# Patient Record
Sex: MALE | Race: WHITE | NOT HISPANIC OR LATINO | Employment: OTHER | URBAN - METROPOLITAN AREA
[De-identification: names, ages, dates, MRNs, and addresses within clinical notes are randomized per-mention and may not be internally consistent; named-entity substitution may affect disease eponyms.]

---

## 2017-06-20 ENCOUNTER — HOSPITAL ENCOUNTER (OUTPATIENT)
Facility: CLINIC | Age: 67
End: 2017-06-20
Attending: COLON & RECTAL SURGERY | Admitting: COLON & RECTAL SURGERY
Payer: MEDICARE

## 2021-08-23 DIAGNOSIS — Z11.59 ENCOUNTER FOR SCREENING FOR OTHER VIRAL DISEASES: ICD-10-CM

## 2021-08-29 ENCOUNTER — LAB (OUTPATIENT)
Dept: URGENT CARE | Facility: URGENT CARE | Age: 71
End: 2021-08-29
Payer: COMMERCIAL

## 2021-08-29 DIAGNOSIS — Z11.59 ENCOUNTER FOR SCREENING FOR OTHER VIRAL DISEASES: ICD-10-CM

## 2021-08-29 PROCEDURE — U0003 INFECTIOUS AGENT DETECTION BY NUCLEIC ACID (DNA OR RNA); SEVERE ACUTE RESPIRATORY SYNDROME CORONAVIRUS 2 (SARS-COV-2) (CORONAVIRUS DISEASE [COVID-19]), AMPLIFIED PROBE TECHNIQUE, MAKING USE OF HIGH THROUGHPUT TECHNOLOGIES AS DESCRIBED BY CMS-2020-01-R: HCPCS

## 2021-08-29 PROCEDURE — U0005 INFEC AGEN DETEC AMPLI PROBE: HCPCS

## 2021-08-30 LAB — SARS-COV-2 RNA RESP QL NAA+PROBE: NEGATIVE

## 2021-09-01 ENCOUNTER — HOSPITAL ENCOUNTER (OUTPATIENT)
Facility: CLINIC | Age: 71
Discharge: HOME | End: 2021-09-01
Attending: COLON & RECTAL SURGERY | Admitting: COLON & RECTAL SURGERY
Payer: COMMERCIAL

## 2021-09-01 VITALS
HEART RATE: 60 BPM | TEMPERATURE: 97.4 F | DIASTOLIC BLOOD PRESSURE: 68 MMHG | HEIGHT: 72 IN | BODY MASS INDEX: 29.8 KG/M2 | SYSTOLIC BLOOD PRESSURE: 108 MMHG | WEIGHT: 220 LBS | OXYGEN SATURATION: 98 % | RESPIRATION RATE: 14 BRPM

## 2021-09-01 PROBLEM — Z78.9 NO BLOOD PRODUCTS: Status: ACTIVE | Noted: 2020-12-07

## 2021-09-01 LAB — COLONOSCOPY: NORMAL

## 2021-09-01 PROCEDURE — 250N000011 HC RX IP 250 OP 636: Performed by: COLON & RECTAL SURGERY

## 2021-09-01 PROCEDURE — 88305 TISSUE EXAM BY PATHOLOGIST: CPT | Mod: TC | Performed by: COLON & RECTAL SURGERY

## 2021-09-01 PROCEDURE — 45380 COLONOSCOPY AND BIOPSY: CPT | Performed by: COLON & RECTAL SURGERY

## 2021-09-01 PROCEDURE — 99153 MOD SED SAME PHYS/QHP EA: CPT | Performed by: COLON & RECTAL SURGERY

## 2021-09-01 PROCEDURE — G0500 MOD SEDAT ENDO SERVICE >5YRS: HCPCS | Performed by: COLON & RECTAL SURGERY

## 2021-09-01 RX ORDER — NALOXONE HYDROCHLORIDE 0.4 MG/ML
0.4 INJECTION, SOLUTION INTRAMUSCULAR; INTRAVENOUS; SUBCUTANEOUS
Status: CANCELLED | OUTPATIENT
Start: 2021-09-01

## 2021-09-01 RX ORDER — FENTANYL CITRATE 50 UG/ML
INJECTION, SOLUTION INTRAMUSCULAR; INTRAVENOUS PRN
Status: COMPLETED | OUTPATIENT
Start: 2021-09-01 | End: 2021-09-01

## 2021-09-01 RX ORDER — PROCHLORPERAZINE MALEATE 5 MG
5 TABLET ORAL EVERY 6 HOURS PRN
Status: CANCELLED | OUTPATIENT
Start: 2021-09-01

## 2021-09-01 RX ORDER — NALOXONE HYDROCHLORIDE 0.4 MG/ML
0.2 INJECTION, SOLUTION INTRAMUSCULAR; INTRAVENOUS; SUBCUTANEOUS
Status: CANCELLED | OUTPATIENT
Start: 2021-09-01

## 2021-09-01 RX ORDER — ONDANSETRON 4 MG/1
4 TABLET, ORALLY DISINTEGRATING ORAL EVERY 6 HOURS PRN
Status: CANCELLED | OUTPATIENT
Start: 2021-09-01

## 2021-09-01 RX ORDER — FLUMAZENIL 0.1 MG/ML
0.2 INJECTION, SOLUTION INTRAVENOUS
Status: CANCELLED | OUTPATIENT
Start: 2021-09-01 | End: 2021-09-02

## 2021-09-01 RX ORDER — ONDANSETRON 2 MG/ML
4 INJECTION INTRAMUSCULAR; INTRAVENOUS EVERY 6 HOURS PRN
Status: CANCELLED | OUTPATIENT
Start: 2021-09-01

## 2021-09-01 RX ORDER — POLYETHYLENE GLYCOL 3350 17 G/17G
17 POWDER, FOR SOLUTION ORAL
COMMUNITY
Start: 2021-07-14

## 2021-09-01 RX ADMIN — MIDAZOLAM 2 MG: 1 INJECTION INTRAMUSCULAR; INTRAVENOUS at 11:43

## 2021-09-01 RX ADMIN — FENTANYL CITRATE 100 MCG: 50 INJECTION, SOLUTION INTRAMUSCULAR; INTRAVENOUS at 11:42

## 2021-09-01 ASSESSMENT — MIFFLIN-ST. JEOR: SCORE: 1780.91

## 2021-09-01 NOTE — BRIEF OP NOTE
[facility]    Brief Operative Note    Pre-operative diagnosis: Personal history of colonic polyps [Z86.010]  Post-operative diagnosis colon polyp, diverticulosis    Procedure: Procedure(s):  COLONOSCOPY, WITH POLYPECTOMY AND BIOPSY  Surgeon: Surgeon(s) and Role:     * Evangelina Aldana MD - Primary  Anesthesia: Conscious Sedation   Estimated blood loss: None  Drains: None  Specimens:   ID Type Source Tests Collected by Time Destination   1 :  Polyp Large Intestine, Colon, Transverse SURGICAL PATHOLOGY EXAM Evangelina Aldana MD 9/1/2021 12:07 PM      Findings:   See Provation procedure note in Epic    Complications: None.  Implants: * No implants in log *

## 2021-09-01 NOTE — H&P
Pre-Endoscopy History and Physical     Kvng Jones MRN# [id number]   YOB: 1950 Age: 71 year old     Date of Procedure: 9/1/2021  [facility]  Type of Endoscopy: colonoscopy  Reason for Procedure: screening, history of polyps  Type of Anesthesia Anticipated: moderate sedation    HPI:    Kvng is a 71 year old male who will be undergoing the above procedure.  Patient reports that he had a colonoscopy in 2017 during which polyps were removed. He has noted trouble with constipation; a trial of Miralax was helpful. He denies bleeding. A few weeks ago he developed right sided abdominal pain. He was seen in urgent care. He reports that a CT scan revealed only cysts in his liver and kidneys.         He denies a personal or family history of anesthesia complications or bleeding disorders.   Prior to Admission medications    Medication Sig Start Date End Date Taking? Authorizing Provider   esomeprazole (NEXIUM) 20 MG DR capsule Take 20 mg by mouth   Yes Reported, Patient   melatonin 5 MG CAPS Take 1 tablet by mouth   Yes Reported, Patient   polyethylene glycol (MIRALAX) 17 GM/Dose powder Take 17 g by mouth 7/14/21  Yes Reported, Patient       No Known Allergies     No current facility-administered medications for this encounter.       Patient Active Problem List   Diagnosis     Erectile dysfunction of nonorganic origin     No blood products     Psoriasis        History reviewed. No pertinent past medical history.     Past Surgical History:   Procedure Laterality Date     ABDOMEN SURGERY      rectal fistula     APPENDECTOMY       COLONOSCOPY         Social History     Tobacco Use     Smoking status: Never Smoker     Smokeless tobacco: Never Used   Substance Use Topics     Alcohol use: Yes     Comment: occasional       History reviewed. No pertinent family history.    REVIEW OF SYSTEMS:     5 point ROS negative except as noted above in HPI, including  Gen., Resp., CV, GI &  system review. Abdominal pain has resolved.       PHYSICAL EXAM:   BP (!) 154/82   Pulse 68   Temp 97.4  F (36.3  C) (Skin)   Resp 14   Ht 1.829 m (6')   Wt 99.8 kg (220 lb)   SpO2 99%   BMI 29.84 kg/m   Estimated body mass index is 29.84 kg/m  as calculated from the following:    Height as of this encounter: 1.829 m (6').    Weight as of this encounter: 99.8 kg (220 lb).   GENERAL APPEARANCE: healthy  MENTAL STATUS: alert  AIRWAY EXAM: Mallampatti Class II (visualization of the soft palate, fauces, and uvula)  RESP: lungs clear to auscultation - no rales, rhonchi or wheezes  CV: regular rates and rhythm      DIAGNOSTICS:    Not indicated      IMPRESSION   ASA Class 2 - Mild systemic disease        PLAN:       A history and physical has been performed. The patient's medications and allergies have been reviewed. The risks and benefits of the procedure and the sedation options and risks were discussed with the patient.  All questions were answered and informed consent was obtained.      The above has been forwarded to the consulting provider.      Signed Electronically by: Evangelina Aldana MD  September 1, 2021

## 2021-09-02 LAB
PATH REPORT.COMMENTS IMP SPEC: NORMAL
PATH REPORT.COMMENTS IMP SPEC: NORMAL
PATH REPORT.FINAL DX SPEC: NORMAL
PATH REPORT.GROSS SPEC: NORMAL
PATH REPORT.MICROSCOPIC SPEC OTHER STN: NORMAL
PATH REPORT.RELEVANT HX SPEC: NORMAL
PHOTO IMAGE: NORMAL

## 2021-09-02 PROCEDURE — 88305 TISSUE EXAM BY PATHOLOGIST: CPT | Mod: 26 | Performed by: PATHOLOGY

## 2021-09-04 ENCOUNTER — HEALTH MAINTENANCE LETTER (OUTPATIENT)
Age: 71
End: 2021-09-04

## 2022-06-07 ENCOUNTER — HOSPITAL ENCOUNTER (OUTPATIENT)
Facility: AMBULATORY SURGERY CENTER | Age: 72
End: 2022-06-07
Attending: SURGERY | Admitting: SURGERY
Payer: COMMERCIAL

## 2022-06-07 ENCOUNTER — TELEPHONE (OUTPATIENT)
Dept: SURGERY | Facility: CLINIC | Age: 72
End: 2022-06-07

## 2022-06-07 ENCOUNTER — OFFICE VISIT (OUTPATIENT)
Dept: SURGERY | Facility: CLINIC | Age: 72
End: 2022-06-07
Payer: COMMERCIAL

## 2022-06-07 VITALS
DIASTOLIC BLOOD PRESSURE: 56 MMHG | HEIGHT: 72 IN | BODY MASS INDEX: 31.02 KG/M2 | SYSTOLIC BLOOD PRESSURE: 112 MMHG | HEART RATE: 103 BPM | WEIGHT: 229 LBS

## 2022-06-07 DIAGNOSIS — K40.91 RECURRENT RIGHT INGUINAL HERNIA: ICD-10-CM

## 2022-06-07 DIAGNOSIS — K40.91 RECURRENT RIGHT INGUINAL HERNIA: Primary | ICD-10-CM

## 2022-06-07 PROCEDURE — 99203 OFFICE O/P NEW LOW 30 MIN: CPT | Performed by: SURGERY

## 2022-06-07 RX ORDER — ROSUVASTATIN CALCIUM 10 MG/1
10 TABLET, COATED ORAL DAILY
COMMUNITY

## 2022-06-07 RX ORDER — ASPIRIN 81 MG/1
81 TABLET, CHEWABLE ORAL DAILY
COMMUNITY
End: 2022-07-13

## 2022-06-07 NOTE — PROGRESS NOTES
Dear Dr. Weems, [facility]  I was asked to see this patient by Faustina [facility] Leah for please see below.  I have seen Kvng Jones and as you know his chief complaint is right inguinal hernia that was repaired 25 years ago.  Patient is not sure if used mesh.    Feels a lump.  This comes and goes. Can be a dull ache for a few days and then no pain.   HPI:  Patient is a 72 year old male  with complaints see above  The patient noticed the symptoms about 2 plus years ago.    Patient has family history of hernia problems  Daughter at 16  Laying down makes the episode better.      Review Of Systems  Respiratory: No shortness of breath, dyspnea on exertion, cough, or hemoptysis  Cardiovascular: negative  Gastrointestinal:  Constipation doing better with toño lax  Endocrine: negative  :  Hesitancy no straining.     10 Point review of systems all others are negative.   There were no vitals taken for this visit.    No past medical history on file.    Past Surgical History:   Procedure Laterality Date     ABDOMEN SURGERY      rectal fistula     APPENDECTOMY       COLONOSCOPY       COLONOSCOPY N/A 9/1/2021    Procedure: COLONOSCOPY, WITH POLYPECTOMY AND BIOPSY;  Surgeon: Evangelina Aldana MD;  Location:  GI       Social History     Socioeconomic History     Marital status:      Spouse name: Not on file     Number of children: Not on file     Years of education: Not on file     Highest education level: Not on file   Occupational History     Not on file   Tobacco Use     Smoking status: Never Smoker     Smokeless tobacco: Never Used   Vaping Use     Vaping Use: Never used   Substance and Sexual Activity     Alcohol use: Yes     Comment: occasional     Drug use: Not on file     Sexual activity: Not on file   Other Topics Concern     Parent/sibling w/ CABG, MI or angioplasty before 65F 55M? Not Asked   Social History Narrative     Not on file     Social  Determinants of Health     Financial Resource Strain: Not on file   Food Insecurity: Not on file   Transportation Needs: Not on file   Physical Activity: Not on file   Stress: Not on file   Social Connections: Not on file   Intimate Partner Violence: Not on file   Housing Stability: Not on file       Current Outpatient Medications   Medication Sig Dispense Refill     esomeprazole (NEXIUM) 20 MG DR capsule Take 20 mg by mouth       melatonin 5 MG CAPS Take 1 tablet by mouth       polyethylene glycol (MIRALAX) 17 GM/Dose powder Take 17 g by mouth         Above was reviewed  Physical exam: There were no vitals taken for this visit.   Patient able to get up on table without difficulty.   Patient is alert and orientated.   Head eyes, nose and mouth within normal limits.  No supraclavicular or cervical adenopathy palpated.  Thyroid within normal limits.  No carotid bruits auscultated.  Lungs are clear to auscultation  Heart is regular rate and rhythm with no murmur or thrills noted.  No costal vertebral angle tenderness noted.  Abdomen is abdomen is soft without significant tenderness, masses, organomegaly or guarding  bowel sounds are positive and no caput medusa noted.  Has a tender lump a the right groin that feels like fat fairly lateral on the hernia site.  If he has had an open with  mesh or this is just a recurrence at the lateral of the closure for a bassini repair.    Testicles are normal.    Skin was warm and pink  Normal Affect      Lower extremity edema is not present.    I did look at the MRI form [facility] and it does not cover the area of the groin.      Assessment: patient had right inguinal hernia repair about 25 years ago.  He has had discomfort in this area off and on.  Worse with lifting.  It used to be that he would walk or lay down and it would feel better but now walking makes it worse when it is bothering him.     25 years ago probably did not use mesh.  This small tender lump that feels may be  lateral to the spermatic cord could be a recurrence.  It seems to be more prominent when the patient is having more discomfort and feels slightly larger.   Plan to do we discussed robotic but not sure I would see this from the inside.    So will do open and repair it and cover with mesh .  Possible mesh just deep to the defect.    There is an increase of damaging the nerve with a redo surgery. .  Did not do well with one of the pain medications.     Risks of surgery include damage to nerves, bleeding, infection, damage to  Vessels, recurrence.  Although mesh is a better long term repair if it gets infected it must be removed.   If the patient has any bacterial infection the week before and is seen by their doctor and started on antibiotics, I can probably still do the surgery if they are vastly improved by the time of surgery, but if the infection starts closer to the surgery date it will be better to cancel and reschedule to a later date.  A cough will also be hard on the repair and uncomfortable post operative.  If the patient is a smoker I did discuss increase risk of recurrence and more pain with the cough.  If the patient is willing to quit smoking would encourage to do so and start at least a week before surgery.  However, if patient is not going to quit then must understand that his repair is more likely to fail.    Risks of surgery discussed including, but not limited to bleeding, infection, recurrence, damage to nerves and what is in the hernia sac.  Risks of anesthesia also discussed.    Discussed massaging hernia back in and using ice if becomes more painful.  If not able to reduce then go to emergency room.  Also discussed hernia belt to use until able to get in for surgery.    Things you will need to do before surgery are getting a preoperative appointment with your primary care doctor to be cleared for surgery.    You will also need a COVID test before surgery and an appointment to see me usually about  2 weeks after the procedure to make sure you are doing well.   Fortunately, when the schedulers call you they will try to get all this set up for you at that one call.     If you have had a positive COVID test in a 90 day window that would include the date of the procedure, let us know that and will need to see proof of this.    Then you do not need a COVID test.  But if over 90 days you do.      Time spent with the patient with greater that 50% of the time in discussion was 40 minutes.  In discussing the plan and options.      Jarred Rdz MD

## 2022-06-07 NOTE — TELEPHONE ENCOUNTER
Type of surgery: Open right inguinal hernia with mesh right   CPT 85451   Recurrent right inguinal hernia K40.91     Location of surgery: [facility]  Date and time of surgery: 07/13/2022  Surgeon: Kendell  Pre-Op Appt Date: 06/23/2022  Post-Op Appt Date: 07/26/2022   Packet sent out: Yes  Pre-cert/Authorization completed:  No prior auth needed for [employer] per online list.    Date: 6-7-22    Bee Medel  Financial Securing/Prior Auth Dept  [phone]

## 2022-06-07 NOTE — LETTER
6/7/2022         RE: Kvng Jones  [address] [place] [address]        Dear Colleague,    Thank you for referring your patient, Kvng Jones, to the [facility]. Please see a copy of my visit note below.    Dear Dr. Weems, [facility]  [facility] for please see below.  I have seen Kvng Jones and as you know his chief complaint is right inguinal hernia that was repaired 25 years ago.  Patient is not sure if used mesh.    Feels a lump.  This comes and goes. Can be a dull ache for a few days and then no pain.   HPI:  Patient is a 72 year old male  with complaints see above  The patient noticed the symptoms about 2 plus years ago.    Patient has family history of hernia problems  Daughter at 16  Laying down makes the episode better.      Review Of Systems  Respiratory: No shortness of breath, dyspnea on exertion, cough, or hemoptysis  Cardiovascular: negative  Gastrointestinal:  Constipation doing better with toño lax  Endocrine: negative  :  Hesitancy no straining.     10 Point review of systems all others are negative.   There were no vitals taken for this visit.    No past medical history on file.    Past Surgical History:   Procedure Laterality Date     ABDOMEN SURGERY      rectal fistula     APPENDECTOMY       COLONOSCOPY       COLONOSCOPY N/A 9/1/2021    Procedure: COLONOSCOPY, WITH POLYPECTOMY AND BIOPSY;  Surgeon: Evangelina Aldana MD;  Location:  GI       Social History     Socioeconomic History     Marital status:      Spouse name: Not on file     Number of children: Not on file     Years of education: Not on file     Highest education level: Not on file   Occupational History     Not on file   Tobacco Use     Smoking status: Never Smoker     Smokeless tobacco: Never Used   Vaping Use     Vaping Use: Never used   Substance and Sexual Activity      Alcohol use: Yes     Comment: occasional     Drug use: Not on file     Sexual activity: Not on file   Other Topics Concern     Parent/sibling w/ CABG, MI or angioplasty before 65F 55M? Not Asked   Social History Narrative     Not on file     Social Determinants of Health     Financial Resource Strain: Not on file   Food Insecurity: Not on file   Transportation Needs: Not on file   Physical Activity: Not on file   Stress: Not on file   Social Connections: Not on file   Intimate Partner Violence: Not on file   Housing Stability: Not on file       Current Outpatient Medications   Medication Sig Dispense Refill     esomeprazole (NEXIUM) 20 MG DR capsule Take 20 mg by mouth       melatonin 5 MG CAPS Take 1 tablet by mouth       polyethylene glycol (MIRALAX) 17 GM/Dose powder Take 17 g by mouth         Above was reviewed  Physical exam: There were no vitals taken for this visit.   Patient able to get up on table without difficulty.   Patient is alert and orientated.   Head eyes, nose and mouth within normal limits.  No supraclavicular or cervical adenopathy palpated.  Thyroid within normal limits.  No carotid bruits auscultated.  Lungs are clear to auscultation  Heart is regular rate and rhythm with no murmur or thrills noted.  No costal vertebral angle tenderness noted.  Abdomen is abdomen is soft without significant tenderness, masses, organomegaly or guarding  bowel sounds are positive and no caput medusa noted.  Has a tender lump a the right groin that feels like fat fairly lateral on the hernia site.  If he has had an open with  mesh or this is just a recurrence at the lateral of the closure for a bassini repair.    Testicles are normal.    Skin was warm and pink  Normal Affect      Lower extremity edema is not present.    I did look at the MRI form [facility] and it does not cover the area of the groin.      Assessment: patient had right inguinal hernia repair about 25 years ago.  He has had discomfort in this area  off and on.  Worse with lifting.  It used to be that he would walk or lay down and it would feel better but now walking makes it worse when it is bothering him.     25 years ago probably did not use mesh.  This small tender lump that feels may be lateral to the spermatic cord could be a recurrence.  It seems to be more prominent when the patient is having more discomfort and feels slightly larger.   Plan to do we discussed robotic but not sure I would see this from the inside.    So will do open and repair it and cover with mesh .  Possible mesh just deep to the defect.    There is an increase of damaging the nerve with a redo surgery. .  Did not do well with one of the pain medications.     Risks of surgery include damage to nerves, bleeding, infection, damage to  Vessels, recurrence.  Although mesh is a better long term repair if it gets infected it must be removed.   If the patient has any bacterial infection the week before and is seen by their doctor and started on antibiotics, I can probably still do the surgery if they are vastly improved by the time of surgery, but if the infection starts closer to the surgery date it will be better to cancel and reschedule to a later date.  A cough will also be hard on the repair and uncomfortable post operative.  If the patient is a smoker I did discuss increase risk of recurrence and more pain with the cough.  If the patient is willing to quit smoking would encourage to do so and start at least a week before surgery.  However, if patient is not going to quit then must understand that his repair is more likely to fail.    Risks of surgery discussed including, but not limited to bleeding, infection, recurrence, damage to nerves and what is in the hernia sac.  Risks of anesthesia also discussed.    Discussed massaging hernia back in and using ice if becomes more painful.  If not able to reduce then go to emergency room.  Also discussed hernia belt to use until able to get in  for surgery.    Things you will need to do before surgery are getting a preoperative appointment with your primary care doctor to be cleared for surgery.    You will also need a COVID test before surgery and an appointment to see me usually about 2 weeks after the procedure to make sure you are doing well.   Fortunately, when the schedulers call you they will try to get all this set up for you at that one call.     If you have had a positive COVID test in a 90 day window that would include the date of the procedure, let us know that and will need to see proof of this.    Then you do not need a COVID test.  But if over 90 days you do.      Time spent with the patient with greater that 50% of the time in discussion was 40 minutes.  In discussing the plan and options.      Jarred Rdz MD      Again, thank you for allowing me to participate in the care of your patient.        Sincerely,        Jarred Rdz MD

## 2022-06-07 NOTE — PATIENT INSTRUCTIONS
I did look at the MRI form [facility] and it does not cover the area of the groin.      Assessment: patient had right inguinal hernia repair about 25 years ago.  He has had discomfort in this area off and on.  Worse with lifting.  It used to be that he would walk or lay down and it would feel better but now walking makes it worse when it is bothering him.     25 years ago probably did not use mesh.  This small tender lump that feels may be lateral to the spermatic cord could be a recurrence.  It seems to be more prominent when the patient is having more discomfort and feels slightly larger.   Plan to do we discussed robotic but not sure I would see this from the inside.    So will do open and repair it and cover with mesh .  Possible mesh just deep to the defect.    There is an increase of damaging the nerve with a redo surgery. .    Risks of surgery include damage to nerves, bleeding, infection, damage to  Vessels, recurrence.  Although mesh is a better long term repair if it gets infected it must be removed.   If the patient has any bacterial infection the week before and is seen by their doctor and started on antibiotics, I can probably still do the surgery if they are vastly improved by the time of surgery, but if the infection starts closer to the surgery date it will be better to cancel and reschedule to a later date.  A cough will also be hard on the repair and uncomfortable post operative.  If the patient is a smoker I did discuss increase risk of recurrence and more pain with the cough.  If the patient is willing to quit smoking would encourage to do so and start at least a week before surgery.  However, if patient is not going to quit then must understand that his repair is more likely to fail.    Risks of surgery discussed including, but not limited to bleeding, infection, recurrence, damage to nerves and what is in the hernia sac.  Risks of anesthesia also discussed.    Discussed massaging hernia back  in and using ice if becomes more painful.  If not able to reduce then go to emergency room.  Also discussed hernia belt to use until able to get in for surgery.    Things you will need to do before surgery are getting a preoperative appointment with your primary care doctor to be cleared for surgery.    You will also need a COVID test before surgery and an appointment to see me usually about 2 weeks after the procedure to make sure you are doing well.   Fortunately, when the schedulers call you they will try to get all this set up for you at that one call.     If you have had a positive COVID test in a 90 day window that would include the date of the procedure, let us know that and will need to see proof of this.    Then you do not need a COVID test.  But if over 90 days you do.

## 2022-06-23 ENCOUNTER — OFFICE VISIT (OUTPATIENT)
Dept: FAMILY MEDICINE | Facility: CLINIC | Age: 72
End: 2022-06-23
Payer: COMMERCIAL

## 2022-06-23 VITALS
SYSTOLIC BLOOD PRESSURE: 120 MMHG | HEART RATE: 83 BPM | OXYGEN SATURATION: 99 % | TEMPERATURE: 99.1 F | HEIGHT: 72 IN | BODY MASS INDEX: 30.75 KG/M2 | WEIGHT: 227 LBS | DIASTOLIC BLOOD PRESSURE: 60 MMHG

## 2022-06-23 DIAGNOSIS — Z01.818 PREOP GENERAL PHYSICAL EXAM: Primary | ICD-10-CM

## 2022-06-23 DIAGNOSIS — N40.1 BENIGN PROSTATIC HYPERPLASIA WITH WEAK URINARY STREAM: ICD-10-CM

## 2022-06-23 DIAGNOSIS — K40.91 RECURRENT RIGHT INGUINAL HERNIA: ICD-10-CM

## 2022-06-23 DIAGNOSIS — R39.12 BENIGN PROSTATIC HYPERPLASIA WITH WEAK URINARY STREAM: ICD-10-CM

## 2022-06-23 PROCEDURE — 99204 OFFICE O/P NEW MOD 45 MIN: CPT | Performed by: FAMILY MEDICINE

## 2022-06-23 RX ORDER — TRIAMCINOLONE ACETONIDE 1 MG/G
OINTMENT TOPICAL
COMMUNITY
Start: 2021-12-07

## 2022-06-23 RX ORDER — TAMSULOSIN HYDROCHLORIDE 0.4 MG/1
0.4 CAPSULE ORAL DAILY
Qty: 90 CAPSULE | Refills: 1 | Status: SHIPPED | OUTPATIENT
Start: 2022-06-23

## 2022-06-23 ASSESSMENT — ENCOUNTER SYMPTOMS
FEVER: 0
JOINT SWELLING: 0
DIARRHEA: 0
DIZZINESS: 0
PALPITATIONS: 0
NAUSEA: 0
HEADACHES: 0
CONSTIPATION: 0
NERVOUS/ANXIOUS: 0
MYALGIAS: 0
HEMATOCHEZIA: 0
ARTHRALGIAS: 0
CHILLS: 0
FREQUENCY: 0
SORE THROAT: 0
COUGH: 0
HEARTBURN: 0
ABDOMINAL PAIN: 0
DYSURIA: 0
SHORTNESS OF BREATH: 0
PARESTHESIAS: 0
EYE PAIN: 0
WEAKNESS: 0
HEMATURIA: 0

## 2022-06-23 ASSESSMENT — ACTIVITIES OF DAILY LIVING (ADL): CURRENT_FUNCTION: NO ASSISTANCE NEEDED

## 2022-06-23 ASSESSMENT — PAIN SCALES - GENERAL: PAINLEVEL: NO PAIN (0)

## 2022-06-23 NOTE — PROGRESS NOTES
[facility]  [address]  [place] [address]  Phone: [phone]  Primary Provider: [facility]  Pre-op Performing Provider: DERRICK ZARATE      PREOPERATIVE EVALUATION:  Today's date: 6/23/2022    Kvng Jones is a 72 year old male who presents for a preoperative evaluation.    Surgical Information:  Surgery/Procedure: open right inguinal hernia with mesh  Surgery Location: [facility]  Surgeon: Dr. Rdz  Surgery Date: 7/13/22  Time of Surgery: 7:30 AM  Where patient plans to recover: At home with family  Fax number for surgical facility: [phone]    Type of Anesthesia Anticipated: General    Assessment & Plan     The proposed surgical procedure is considered LOW risk.      ICD-10-CM    1. Preop general physical exam  Z01.818    2. Recurrent right inguinal hernia  K40.91    3. Benign prostatic hyperplasia with weak urinary stream  N40.1 tamsulosin (FLOMAX) 0.4 MG capsule    R39.12      He is a new patient to me and our clinic, but apparently plans to establish primary care with me.  He had seen a provider previously for BPH symptoms which are still bothersome to him.  We will start him on Flomax for that.  I advised him to follow-up with me in 4 to 6 months for routine physical and some repeat fasting lab work.    He declines any COVID booster dose today.    Risks and Recommendations:  The patient has the following additional risks and recommendations for perioperative complications:   - No identified additional risk factors other than previously addressed    Medication Instructions:   - aspirin: Discontinue aspirin 7-10 days prior to procedure to reduce bleeding risk. It should be resumed postoperatively.     RECOMMENDATION:  APPROVAL GIVEN to proceed with proposed procedure, without further diagnostic evaluation.        Subjective     HPI related to upcoming procedure: 72-year-old male had a previous right inguinal hernia repair  about 25 years ago.  He has been feeling a small lump in the right inguinal area with occasional discomfort in recent months and was seen by general surgery and felt to have a probable recurrent right inguinal hernia.  He is coming in now for surgical treatment of this.    Preop Questions 6/23/2022   1. Have you ever had a heart attack or stroke? No   2. Have you ever had surgery on your heart or blood vessels, such as a stent placement, a coronary artery bypass, or surgery on an artery in your head, neck, heart, or legs? No   3. Do you have chest pain with activity? No   4. Do you have a history of  heart failure? No   5. Do you currently have a cold, bronchitis or symptoms of other infection? No   6. Do you have a cough, shortness of breath, or wheezing? No   7. Do you or anyone in your family have previous history of blood clots? No   8. Do you or does anyone in your family have a serious bleeding problem such as prolonged bleeding following surgeries or cuts? No   9. Have you ever had problems with anemia or been told to take iron pills? No   10. Have you had any abnormal blood loss such as black, tarry or bloody stools? No   11. Have you ever had a blood transfusion? No   12. Are you willing to have a blood transfusion if it is medically needed before, during, or after your surgery? NO    13. Have you or any of your relatives ever had problems with anesthesia? No   14. Do you have sleep apnea, excessive snoring or daytime drowsiness? No   15. Do you have any artifical heart valves or other implanted medical devices like a pacemaker, defibrillator, or continuous glucose monitor? No   16. Do you have artificial joints? No   17. Are you allergic to latex? No         Status of Chronic Conditions:  See problem list for active medical problems.  Problems all longstanding and stable, except as noted/documented.  See ROS for pertinent symptoms related to these conditions.      Review of Systems   Constitutional:  Negative for chills and fever.   HENT: Negative for congestion, ear pain, hearing loss and sore throat.    Eyes: Negative for pain and visual disturbance.   Respiratory: Negative for cough and shortness of breath.    Cardiovascular: Negative for chest pain and palpitations.   Gastrointestinal: Negative for abdominal pain, constipation, diarrhea and nausea.   Genitourinary: Negative for dysuria, frequency, genital sores, hematuria, penile discharge and urgency.   Musculoskeletal: Negative for arthralgias, joint swelling and myalgias.   Skin: Negative for rash.   Neurological: Negative for dizziness, weakness and headaches.   Psychiatric/Behavioral: The patient is not nervous/anxious.          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 No pertinent past medical history.  Past Surgical History:   Procedure Laterality Date     ABDOMEN SURGERY      rectal fistula     APPENDECTOMY       COLONOSCOPY       COLONOSCOPY N/A 9/1/2021    Procedure: COLONOSCOPY, WITH POLYPECTOMY AND BIOPSY;  Surgeon: Evangelina Aldana MD;  Location:  GI     Current Outpatient Medications   Medication Sig Dispense Refill     aspirin (ASA) 81 MG chewable tablet Take 81 mg by mouth daily       melatonin 5 MG CAPS Take 1 tablet by mouth       polyethylene glycol (MIRALAX) 17 GM/Dose powder Take 17 g by mouth       rosuvastatin (CRESTOR) 10 MG tablet Take 10 mg by mouth daily       triamcinolone (KENALOG) 0.1 % external ointment        esomeprazole (NEXIUM) 20 MG DR capsule Take 20 mg by mouth (Patient not taking: Reported on 6/23/2022)         No Known Allergies     Social History     Tobacco Use     Smoking  status: Never Smoker     Smokeless tobacco: Never Used   Substance Use Topics     Alcohol use: Yes     Comment: occasional     History reviewed. No pertinent family history.  History   Drug Use Not on file         Objective     /60 (BP Location: Right arm, Patient Position: Sitting, Cuff Size: Adult Regular)   Pulse 83   Temp 99.1  F (37.3  C) (Oral)   Ht 1.829 m (6')   Wt 103 kg (227 lb)   SpO2 99%   BMI 30.79 kg/m      Physical Exam    GENERAL APPEARANCE: healthy, alert and no distress     EYES: EOMI,  PERRL     HENT: Grossly normal     NECK: no adenopathy, no asymmetry, masses, or scars and thyroid normal to palpation     RESP: lungs clear to auscultation - no rales, rhonchi or wheezes     CV: regular rates and rhythm, normal S1 S2, no S3 or S4 and no murmur, click or rub     ABDOMEN:  soft, nontender, no HSM or masses      MS: extremities normal -- no gross deformities noted, no evidence of inflammation in joints     SKIN: no suspicious lesions or rashes     NEURO: Normal strength and tone, sensory exam grossly normal, mentation intact and speech normal     PSYCH: mentation appears normal and affect normal/bright    No results for input(s): HGB, PLT, INR, NA, POTASSIUM, CR, A1C in the last 05371 hours.     Diagnostics:  No labs were ordered during this visit.  He had lab work done a couple of months ago through the [facility] system including a CBC and BMP which should be available in his chart.  EKG: He had an EKG done last fall as well as a stress echocardiogram which was normal; these were also done through the [facility] system and should be available for review if desired.    Revised Cardiac Risk Index (RCRI):  The patient has the following serious cardiovascular risks for perioperative complications:   - No serious cardiac risks = 0 points     RCRI Interpretation: 0 points: Class I (very low risk - 0.4% complication rate)           Signed Electronically by: Tino Deal  "MD  Copy of this evaluation report is provided to requesting physician.      Answers for HPI/ROS submitted by the patient on 6/23/2022  In general, how would you rate your overall physical health?: good  Frequency of exercise:: 2-3 days/week  Do you usually eat at least 4 servings of fruit and vegetables a day, include whole grains & fiber, and avoid regularly eating high fat or \"junk\" foods? : Yes  Taking medications regularly:: Yes  Medication side effects:: None  Activities of Daily Living: no assistance needed  Home safety: no safety concerns identified  Hearing Impairment:: no hearing concerns  In the past 6 months, have you been bothered by leaking of urine?: No  Blood in stool: No  heartburn: No  peripheral edema: No  mood changes: No  Skin sensation changes: No  impotence: Yes  In general, how would you rate your overall mental or emotional health?: excellent  Additional concerns today:: Yes  Duration of exercise:: Less than 15 minutes      "

## 2022-07-11 NOTE — TELEPHONE ENCOUNTER
Patient called back noting that he wanted to reschedule his surgery,   Patient has been rescheduled for 07/13/2022 at [facility]   preop already done 06/23/2022  Postop rescheduled to 07/29/2022

## 2022-07-12 ENCOUNTER — ANESTHESIA EVENT (OUTPATIENT)
Dept: SURGERY | Facility: AMBULATORY SURGERY CENTER | Age: 72
End: 2022-07-12
Payer: COMMERCIAL

## 2022-07-13 ENCOUNTER — ANESTHESIA (OUTPATIENT)
Dept: SURGERY | Facility: AMBULATORY SURGERY CENTER | Age: 72
End: 2022-07-13
Payer: COMMERCIAL

## 2022-07-13 ENCOUNTER — HOSPITAL ENCOUNTER (OUTPATIENT)
Facility: AMBULATORY SURGERY CENTER | Age: 72
Discharge: HOME | End: 2022-07-13
Attending: SURGERY | Admitting: SURGERY
Payer: COMMERCIAL

## 2022-07-13 VITALS
DIASTOLIC BLOOD PRESSURE: 70 MMHG | RESPIRATION RATE: 16 BRPM | OXYGEN SATURATION: 98 % | SYSTOLIC BLOOD PRESSURE: 142 MMHG | TEMPERATURE: 97.5 F | WEIGHT: 227 LBS | BODY MASS INDEX: 30.79 KG/M2 | HEART RATE: 66 BPM

## 2022-07-13 DIAGNOSIS — K40.91 RECURRENT RIGHT INGUINAL HERNIA: Primary | ICD-10-CM

## 2022-07-13 PROCEDURE — 49520 REREPAIR ING HERNIA REDUCE: CPT | Mod: RT

## 2022-07-13 PROCEDURE — 49505 PRP I/HERN INIT REDUC >5 YR: CPT | Mod: RT

## 2022-07-13 PROCEDURE — 49520 REREPAIR ING HERNIA REDUCE: CPT | Mod: RT | Performed by: SURGERY

## 2022-07-13 PROCEDURE — G8907 PT DOC NO EVENTS ON DISCHARG: HCPCS

## 2022-07-13 PROCEDURE — G8916 PT W IV AB GIVEN ON TIME: HCPCS

## 2022-07-13 DEVICE — MESH PROGRIP 4.7X3" PARIETEX RIGHT TEM1208GR: Type: IMPLANTABLE DEVICE | Site: GROIN | Status: FUNCTIONAL

## 2022-07-13 RX ORDER — CEFAZOLIN SODIUM 2 G/100ML
2 INJECTION, SOLUTION INTRAVENOUS
Status: COMPLETED | OUTPATIENT
Start: 2022-07-13 | End: 2022-07-13

## 2022-07-13 RX ORDER — PROPOFOL 10 MG/ML
INJECTION, EMULSION INTRAVENOUS CONTINUOUS PRN
Status: DISCONTINUED | OUTPATIENT
Start: 2022-07-13 | End: 2022-07-13

## 2022-07-13 RX ORDER — BUPIVACAINE HYDROCHLORIDE AND EPINEPHRINE 2.5; 5 MG/ML; UG/ML
INJECTION, SOLUTION INFILTRATION; PERINEURAL PRN
Status: DISCONTINUED | OUTPATIENT
Start: 2022-07-13 | End: 2022-07-13 | Stop reason: HOSPADM

## 2022-07-13 RX ORDER — PROPOFOL 10 MG/ML
INJECTION, EMULSION INTRAVENOUS PRN
Status: DISCONTINUED | OUTPATIENT
Start: 2022-07-13 | End: 2022-07-13

## 2022-07-13 RX ORDER — SODIUM CHLORIDE, SODIUM LACTATE, POTASSIUM CHLORIDE, CALCIUM CHLORIDE 600; 310; 30; 20 MG/100ML; MG/100ML; MG/100ML; MG/100ML
INJECTION, SOLUTION INTRAVENOUS CONTINUOUS
Status: DISCONTINUED | OUTPATIENT
Start: 2022-07-13 | End: 2022-07-14 | Stop reason: HOSPADM

## 2022-07-13 RX ORDER — LIDOCAINE HYDROCHLORIDE 20 MG/ML
INJECTION, SOLUTION INFILTRATION; PERINEURAL PRN
Status: DISCONTINUED | OUTPATIENT
Start: 2022-07-13 | End: 2022-07-13

## 2022-07-13 RX ORDER — OXYCODONE HYDROCHLORIDE 5 MG/1
5 TABLET ORAL EVERY 4 HOURS PRN
Qty: 16 TABLET | Refills: 0 | Status: SHIPPED | OUTPATIENT
Start: 2022-07-13 | End: 2022-07-16

## 2022-07-13 RX ORDER — DEXAMETHASONE SODIUM PHOSPHATE 4 MG/ML
INJECTION, SOLUTION INTRA-ARTICULAR; INTRALESIONAL; INTRAMUSCULAR; INTRAVENOUS; SOFT TISSUE PRN
Status: DISCONTINUED | OUTPATIENT
Start: 2022-07-13 | End: 2022-07-13

## 2022-07-13 RX ORDER — OXYCODONE HYDROCHLORIDE 5 MG/1
5 TABLET ORAL EVERY 4 HOURS PRN
Status: DISCONTINUED | OUTPATIENT
Start: 2022-07-13 | End: 2022-07-14 | Stop reason: HOSPADM

## 2022-07-13 RX ORDER — CEFAZOLIN SODIUM 2 G/100ML
2 INJECTION, SOLUTION INTRAVENOUS SEE ADMIN INSTRUCTIONS
Status: DISCONTINUED | OUTPATIENT
Start: 2022-07-13 | End: 2022-07-14 | Stop reason: HOSPADM

## 2022-07-13 RX ORDER — LIDOCAINE 40 MG/G
CREAM TOPICAL
Status: DISCONTINUED | OUTPATIENT
Start: 2022-07-13 | End: 2022-07-14 | Stop reason: HOSPADM

## 2022-07-13 RX ORDER — PHENYLEPHRINE HYDROCHLORIDE 10 MG/ML
INJECTION INTRAVENOUS PRN
Status: DISCONTINUED | OUTPATIENT
Start: 2022-07-13 | End: 2022-07-13

## 2022-07-13 RX ORDER — FENTANYL CITRATE 50 UG/ML
25 INJECTION, SOLUTION INTRAMUSCULAR; INTRAVENOUS
Status: DISCONTINUED | OUTPATIENT
Start: 2022-07-13 | End: 2022-07-14 | Stop reason: HOSPADM

## 2022-07-13 RX ORDER — FENTANYL CITRATE 50 UG/ML
INJECTION, SOLUTION INTRAMUSCULAR; INTRAVENOUS PRN
Status: DISCONTINUED | OUTPATIENT
Start: 2022-07-13 | End: 2022-07-13

## 2022-07-13 RX ORDER — ACETAMINOPHEN 325 MG/1
975 TABLET ORAL ONCE
Status: COMPLETED | OUTPATIENT
Start: 2022-07-13 | End: 2022-07-13

## 2022-07-13 RX ORDER — ONDANSETRON 2 MG/ML
INJECTION INTRAMUSCULAR; INTRAVENOUS PRN
Status: DISCONTINUED | OUTPATIENT
Start: 2022-07-13 | End: 2022-07-13

## 2022-07-13 RX ORDER — ONDANSETRON 4 MG/1
4 TABLET, ORALLY DISINTEGRATING ORAL EVERY 30 MIN PRN
Status: DISCONTINUED | OUTPATIENT
Start: 2022-07-13 | End: 2022-07-14 | Stop reason: HOSPADM

## 2022-07-13 RX ORDER — FENTANYL CITRATE 50 UG/ML
25 INJECTION, SOLUTION INTRAMUSCULAR; INTRAVENOUS EVERY 5 MIN PRN
Status: DISCONTINUED | OUTPATIENT
Start: 2022-07-13 | End: 2022-07-14 | Stop reason: HOSPADM

## 2022-07-13 RX ORDER — ONDANSETRON 2 MG/ML
4 INJECTION INTRAMUSCULAR; INTRAVENOUS EVERY 30 MIN PRN
Status: DISCONTINUED | OUTPATIENT
Start: 2022-07-13 | End: 2022-07-14 | Stop reason: HOSPADM

## 2022-07-13 RX ADMIN — DEXAMETHASONE SODIUM PHOSPHATE 8 MG: 4 INJECTION, SOLUTION INTRA-ARTICULAR; INTRALESIONAL; INTRAMUSCULAR; INTRAVENOUS; SOFT TISSUE at 13:29

## 2022-07-13 RX ADMIN — FENTANYL CITRATE 50 MCG: 50 INJECTION, SOLUTION INTRAMUSCULAR; INTRAVENOUS at 12:56

## 2022-07-13 RX ADMIN — LIDOCAINE HYDROCHLORIDE 60 MG: 20 INJECTION, SOLUTION INFILTRATION; PERINEURAL at 12:56

## 2022-07-13 RX ADMIN — PHENYLEPHRINE HYDROCHLORIDE 50 MCG: 10 INJECTION INTRAVENOUS at 13:19

## 2022-07-13 RX ADMIN — PHENYLEPHRINE HYDROCHLORIDE 100 MCG: 10 INJECTION INTRAVENOUS at 13:35

## 2022-07-13 RX ADMIN — PHENYLEPHRINE HYDROCHLORIDE 100 MCG: 10 INJECTION INTRAVENOUS at 14:09

## 2022-07-13 RX ADMIN — ACETAMINOPHEN 975 MG: 325 TABLET ORAL at 12:40

## 2022-07-13 RX ADMIN — CEFAZOLIN SODIUM 2 G: 2 INJECTION, SOLUTION INTRAVENOUS at 12:48

## 2022-07-13 RX ADMIN — PROPOFOL 150 MCG/KG/MIN: 10 INJECTION, EMULSION INTRAVENOUS at 12:56

## 2022-07-13 RX ADMIN — PROPOFOL 150 MG: 10 INJECTION, EMULSION INTRAVENOUS at 12:56

## 2022-07-13 RX ADMIN — Medication 50 MG: at 12:56

## 2022-07-13 RX ADMIN — PHENYLEPHRINE HYDROCHLORIDE 100 MCG: 10 INJECTION INTRAVENOUS at 13:54

## 2022-07-13 RX ADMIN — PHENYLEPHRINE HYDROCHLORIDE 100 MCG: 10 INJECTION INTRAVENOUS at 13:11

## 2022-07-13 RX ADMIN — ONDANSETRON 4 MG: 2 INJECTION INTRAMUSCULAR; INTRAVENOUS at 14:26

## 2022-07-13 RX ADMIN — PHENYLEPHRINE HYDROCHLORIDE 100 MCG: 10 INJECTION INTRAVENOUS at 13:08

## 2022-07-13 RX ADMIN — SODIUM CHLORIDE, SODIUM LACTATE, POTASSIUM CHLORIDE, CALCIUM CHLORIDE: 600; 310; 30; 20 INJECTION, SOLUTION INTRAVENOUS at 12:36

## 2022-07-13 RX ADMIN — Medication 20 MG: at 13:56

## 2022-07-13 ASSESSMENT — ENCOUNTER SYMPTOMS: SEIZURES: 0

## 2022-07-13 NOTE — ANESTHESIA CARE TRANSFER NOTE
Patient: Kvng Jones    Procedure: Procedure(s):  open right inguinal hernia with mesh       Diagnosis: Recurrent right inguinal hernia [K40.91]  Diagnosis Additional Information: No value filed.    Anesthesia Type:   General     Note:    Oropharynx: oropharynx clear of all foreign objects  Level of Consciousness: awake  Oxygen Supplementation: face mask  Level of Supplemental Oxygen (L/min / FiO2): 6  Independent Airway: airway patency satisfactory and stable  Dentition: dentition unchanged  Vital Signs Stable: post-procedure vital signs reviewed and stable  Report to RN Given: handoff report given  Patient transferred to: PACU  Comments: To PACU after suctioned and extubated awake.  Report to RN. VSS, Respiratory status stable.  Handoff Report: Identifed the Patient, Identified the Reponsible Provider, Reviewed the pertinent medical history, Discussed the surgical course, Reviewed Intra-OP anesthesia mangement and issues during anesthesia, Set expectations for post-procedure period and Allowed opportunity for questions and acknowledgement of understanding      Vitals:  Vitals Value Taken Time   BP     Temp     Pulse 73 07/13/22 1444   Resp 14 07/13/22 1444   SpO2 100 % 07/13/22 1444   Vitals shown include unvalidated device data.    Electronically Signed By: EFRAÍN Chavez CRNA  July 13, 2022  2:44 PM

## 2022-07-13 NOTE — OP NOTE
OPERATIVE REPORT    July 13, 2022  Preoperative diagnosis: right  Inguinal hernia recurrent.   Postoperative diagnosis:  same with small defect deep to previous repair. Most likely where a suture tore thru   Procedure:  Right  Inguinal hernia repair with right  ProGrip 12 by 8 cm and 4.3 cm ventrlaex placed in defect and straps sutured to the surrounding tissue to hold in place.   Anesthesia:  General anesthesia with 0.25% marcaine placed thru out the procedure.   Blood loss: 6 cc  Specimen: none sent  Surgeon : Jarred Rdz M.D.  Indications:  Kvng Jones is a 72 year old year old male with a recurrent right  inguinal hernia that is causing  discomfort.  It was felt that it should be repaired.  Risks and complications were explained to the patient including bleeding, risk of anenesthesia, infection, recurrance of hernia, damage to bowel, bladder and  vessels to the testicle.  That mesh is a better long term repair, but the down side is that if it gets infected will need to be removed.  Questions were answered and postoperative instructions were given to patient and any one with the patient.      Procedure:  The patient was brought to the OR, placed in supine position  after given  general anesthesia, the right  groin was prepped in sterile manner. After a time out, an Incision was made in the usual fashion with a knife and then blunt and sharp and cautery was used to dissect down to the external oblique.  The layers were fused together so it took some time to free the layers up.  The external oblique was opened in the direction of its fibers at the external canal with cautery and then the rest was mainly blunt dissection.  The spermatic cord was carefully freed of surrounding attachments and placed in a Penrose drain.  All attachments with the cremasteric muscles were taken down with cautery.  There was a hernia palpated just lateral and inferior to the spermatic cord.  After freeing up the  area I was able to place a 4.3 cm ventralex in the space it was going to cover the defect well.  It was sutured at the straps with 3-0 vicryl and then surrounding tissue was closed around it. With the same suture.        it was an indirect hernia.  The space around the spermatic cord was also closed with still enough space to allow for swelling of the spermatic cord.         After that was done, I was able to place a finger between my closure and the spermatic cord without much difficulty.  The  Right  12x8 cm ProGrip mesh was placed.  It was secured at the pubic tubercle with an 0-Vicryl suture at the marking on the mesh and then was laid on the floor, recreating the internal canal by bringing it around the spermatic cord.  It laid there very nicely.  I was able to place a finger between the spermatic cord and the mesh, but it looks like it is going to cover the area well.  After that was done and laid nicely, the area was irrigated with antibiotic irrigation with good clean return.  No evidence of any bleeding.  The spermatic cord was then placed on top of the mesh and the nerve which had been kept out of the way and seemed to be intact, was placed on  top of the mesh also.  Then, the external oblique fascia was closed with a continuous running 0 Vicryl suture, taking great care not to involve the nerve and closing over the spermatic cord.  The Ovidio's fascia was brought together with several interrupted 3-0 Vicryl sutures, and skin was closed with continuous running 4-0 Monocryl, covered with tincture of Benzoin, Steri-Strips and a Tegaderm, along with an abdominal binder.  The patient did receive antibiotics preoperatively.       Plan is to discharge him home today.  Lift no more than 20 pounds for 3 weeks.  I will see him back in approximately 2 weeks.           JESSY PEREZ MD

## 2022-07-13 NOTE — PROGRESS NOTES
No changes from previous exam  Procedure explained.  Questions answered  Plan open right inguinal hernia repair with mesh  Jarred Rdz MD

## 2022-07-13 NOTE — ANESTHESIA POSTPROCEDURE EVALUATION
Patient: Kvng Jones    Procedure: Procedure(s):  open right inguinal hernia with mesh       Anesthesia Type:  General    Note:  Disposition: Outpatient   Postop Pain Control: Uneventful            Sign Out: Well controlled pain   PONV: No   Neuro/Psych: Uneventful            Sign Out: Acceptable/Baseline neuro status   Airway/Respiratory: Uneventful            Sign Out: Acceptable/Baseline resp. status   CV/Hemodynamics: Uneventful            Sign Out: Acceptable CV status; No obvious hypovolemia; No obvious fluid overload   Other NRE: NONE   DID A NON-ROUTINE EVENT OCCUR? No           Last vitals:  Vitals Value Taken Time   /69 07/13/22 1515   Temp 97.5  F (36.4  C) 07/13/22 1441   Pulse 66 07/13/22 1529   Resp 13 07/13/22 1529   SpO2 98 % 07/13/22 1529   Vitals shown include unvalidated device data.    Electronically Signed By: Ryan Morales MD  July 13, 2022  3:30 PM

## 2022-07-13 NOTE — ANESTHESIA PROCEDURE NOTES
Airway       Patient location during procedure: OR       Procedure Start/Stop Times: 7/13/2022 12:57 PM  Staff -        Performed By: CRNAIndications and Patient Condition       Indications for airway management: catherine-procedural       Induction type:intravenous       Mask difficulty assessment: 2 - vent by mask + OA or adjuvant +/- NMBA    Final Airway Details       Final airway type: endotracheal airway       Successful airway: ETT - single and Oral  Endotracheal Airway Details        ETT size (mm): 8.0       Cuffed: yes       Successful intubation technique: direct laryngoscopy       DL Blade Type: MAC 3       Grade View of Cords: 3       Adjucts: stylet       Position: Right    Post intubation assessment        Placement verified by: capnometry, equal breath sounds and chest rise        Number of attempts at approach: 1       Secured with: plastic tape       Ease of procedure: easy       Dentition: Intact and Unchanged    Medication(s) Administered   Medication Administration Time: 7/13/2022 12:57 PM

## 2022-07-13 NOTE — DISCHARGE INSTRUCTIONS
[facility]   Adult Discharge Orders & Instructions     For 24 hours after surgery    Get plenty of rest.  A responsible adult must stay with you for at least 24 hours after you leave the hospital.   Do not drive or use heavy equipment.  If you have weakness or tingling, don't drive or use heavy equipment until this feeling goes away.  Do not drink alcohol.  Avoid strenuous or risky activities.  Ask for help when climbing stairs.   You may feel lightheaded.  IF so, sit for a few minutes before standing.  Have someone help you get up.   If you have nausea (feel sick to your stomach): Drink only clear liquids such as apple juice, ginger ale, broth or 7-Up.  Rest may also help.  Be sure to drink enough fluids.  Move to a regular diet as you feel able.  You may have a slight fever. Call the doctor if your fever is over 100 F (37.7 C) (taken under the tongue) or lasts longer than 24 hours.  You may have a dry mouth, a sore throat, muscle aches or trouble sleeping.  These should go away after 24 hours.  Do not make important or legal decisions.     Call your doctor for any of the followin.  Signs of infection (fever, growing tenderness at the surgery site, a large amount of drainage or bleeding, severe pain, foul-smelling drainage, redness, swelling).    2. It has been over 8 to 10 hours since surgery and you are still not able to urinate (pass water).    3.  Headache for over 24 hours.    4.  Numbness, tingling or weakness the day after surgery (if you had spinal anesthesia).                  5. Signs of Covid-19 infection (temperature over 100 degrees, shortness of breath, cough, loss of taste/smell, generalized body aches, persistent headache,                  chills, sore throat, nausea/vomiting/diarrhea).    To contact  Dr Halvorson call [phone]    ________________________________________    HERNIORRHAPHY DISCHARGE INSTRUCTIONS  DR. JESSY PEREZ    1. You may resume your regular  diet when you feel you are ready to. DO NOT drink alcoholic beverages for 24 hours or while you are taking prescription medication.    2. Limit your activities for the first 48 hours. Gradually, increase them as tolerated. You may use stairs. I encourage you to walk as tolerated. No lifting greater that 20 pounds for 6 weeks.    3. You will have some discomfort at the incision sites. This is expected. This should improve over the next 2-3 days. Ice and pain medication will help with this pain. Use prescribed pain medication as instructed.    4. Bruising and mild swelling is normal after surgery. For males it is common to have bruising going into the penis and scrotum. The area below and around the incision(s) will be hard and elevated. This is normal. I call it the healing ridge. This will resolve slowly over the next several months. If you feel the pain is increasing and cannot explain it by increasing activity please call us at [phone].    5. The dressing will often have some blood on it. You may shower 24 hours after surgery. No baths for 2 weeks after surgery. Clean gently over incision site. If clear plastic covering or steri-strip comes off and there is still some bleeding or drainage then cover with gauze or band-aid. If no bleeding, there is no reason to cover site. The abdominal binder may be removed after 24 hours after surgery. You may continue to wear it however for comfort. I suggest  you wear an old t-shirt under the abdominal binder for a more comfortable wear.    6. Avoid Aspirin for the first 72 hours after the procedure. This medication may increase the tendency to bleed.    7. Use the following medications (in addition to your normal meds) as shown:  a. Percocet 5 mg 1-2 every 6 hours as needed for severe pain. This contains 325 mg of Tylenol (acetaminophen) per tablet.  Please do not take more than 4 grams of Tylenol (acetaminophen) per day. For example, you may take 1 Percocet and 1  Tylenol, or 2 Percocet and no Tylenol, or 2 Tylenol and no Percocet every 6 hours.  b. Tylenol (acetaminophen) 500 mg every 6 hours as needed for mild pain. Do not take more than 1000 mg every 6 hours. (see above).  c. Motrin (ibuprofen) 200-800 mg every 6 hours as needed for mild to moderate pain. Take with food.     8. Notify Dr. Rdz's clinic at [phone] if:  Your discomfort is not relieved by your pain medication.  You have signs of infection such as temperature above 100.5 degrees orally, chills, or increasing daily discomfort.  Incision site is becoming more red and/or there is purulent drainage.  You have questions or concerns.    9. Please call [phone] to schedule a follow up appointment in about 2 weeks.    10. When taking narcotics (pain medication more than Tylenol [acetaminophen] and Motrin [ibuprofen]) it is important to keep your stools soft to avoid constipation and pain with straining. This is best done by drinking fluids (non-alcoholic and non-caffeinated) and taking a stool softener (i.e. Metamucil or milk of magnesia). You may be able to use non-narcotics for pain relief especially by the 3rd post- operative day. Tylenol (acetaminophen) 500 mg every 6 hours and/or Motrin (ibuprofen) 200-800 mg every 6 hours. Please do not take more than 4 grams of Tylenol (acetaminophen) per day. Remember your Percocet does have Tylenol (acetaminophen) already in it. Please take Motrin (ibuprofen) with food to help protect the stomach. If you have a history of stomach ulcers or stomach problems, do not take Motrin (ibuprofen).     11. Do not drive or operate heavy machinery for 24 hours after surgery or when taking narcotics. You may resume driving when feel that you can safely avoid an accident and are not taking narcotics. This is usually 5 to 7 days after surgery. You should not be alone for 24 hours after surgery.    12. Have milk of magnesia available at home so that when you take the pain  medications you take 1-2 tablepoons a day, to help reduce problems with constipation.      13. If you have questions after your procedure/surgery please contact Dr Rdz's primary clinic in [place]. You can call [phone], your other option is to send us a [email or web address] message through your [email or web address] portal to Dr Rdz's team. For urgent and non urgent matters these options are best. If your symptoms are emergent or cannot wait, please proceed to [facility] and let them know who you had surgery with.    You had 975 mg of Tylenol at 1240. You may repeat this after 6:40p.   Maximum amount of Tylenol/Acetaminophen in a 24 hour period is 4,000 mg.

## 2022-07-13 NOTE — ANESTHESIA PREPROCEDURE EVALUATION
Anesthesia Pre-Procedure Evaluation    Patient: Kvng Jones   MRN: [id number] : 1950        Procedure : Procedure(s):  open right inguinal hernia with mesh          History reviewed. No pertinent past medical history.   Past Surgical History:   Procedure Laterality Date     ABDOMEN SURGERY      rectal fistula     APPENDECTOMY  1972     COLONOSCOPY N/A 2021    Procedure: COLONOSCOPY, WITH POLYPECTOMY AND BIOPSY;  Surgeon: Evangelina Aldana MD;  Location: [facility]     INGUINAL HERNIA REPAIR Right       No Known Allergies   Social History     Tobacco Use     Smoking status: Never Smoker     Smokeless tobacco: Never Used   Substance Use Topics     Alcohol use: Yes     Comment: occasional      Wt Readings from Last 1 Encounters:   22 103 kg (227 lb)        Anesthesia Evaluation   Pt has had prior anesthetic. Type: General.    No history of anesthetic complications       ROS/MED HX  ENT/Pulmonary:    (-) asthma   Neurologic:    (-) no seizures and migraines   Cardiovascular:     (+) -----Previous cardiac testing   Echo: Date: Results:    Stress Test: Date: December Results:    ECG Reviewed: Date: Results:    Cath: Date: Results:   (-) hypertension, CHF and murmur   METS/Exercise Tolerance: >4 METS    Hematologic:       Musculoskeletal:       GI/Hepatic:    (-) liver disease   Renal/Genitourinary:    (-) renal disease   Endo:    (-) Type II DM   Psychiatric/Substance Use:    (-) psychiatric history   Infectious Disease:    (-) Recent Fever   Malignancy:       Other: Comment: Hard of hearing           Physical Exam    Airway        Mallampati: I   TM distance: > 3 FB   Neck ROM: full   Mouth opening: > 3 cm    Respiratory Devices and Support         Dental  no notable dental history         Cardiovascular          Rhythm and rate: regular and normal (-) no murmur    Pulmonary   pulmonary exam normal        breath sounds clear to auscultation           OUTSIDE LABS:  CBC: No results found  for: WBC, HGB, HCT, PLT  BMP: No results found for: NA, POTASSIUM, CHLORIDE, CO2, BUN, CR, GLC  COAGS: No results found for: PTT, INR, FIBR  POC: No results found for: BGM, HCG, HCGS  HEPATIC: No results found for: ALBUMIN, PROTTOTAL, ALT, AST, GGT, ALKPHOS, BILITOTAL, BILIDIRECT, SIVA  OTHER: No results found for: PH, LACT, A1C, MELISSA, PHOS, MAG, LIPASE, AMYLASE, TSH, T4, T3, CRP, SED    Anesthesia Plan    ASA Status:  1   NPO Status:  NPO Appropriate    Anesthesia Type: General.     - Airway: LMA   Induction: Intravenous, Propofol.   Maintenance: Balanced.        Consents    Anesthesia Plan(s) and associated risks, benefits, and realistic alternatives discussed. Questions answered and patient/representative(s) expressed understanding.    - Discussed:     - Discussed with:  Patient      - Specific Concerns: sore throat, post op pain/nausea, dental damage, rare major complications.     - Extended Intubation/Ventilatory Support Discussed: No.      - Patient is DNR/DNI Status: No    Use of blood products discussed: No .     Postoperative Care    Pain management: IV analgesics, Oral pain medications, Multi-modal analgesia.   PONV prophylaxis: Ondansetron (or other 5HT-3), Dexamethasone or Solumedrol, Background Propofol Infusion     Comments:           H&P reviewed: Unable to attach H&P to encounter due to EHR limitations. H&P Update: appropriate H&P reviewed, patient examined. No interval changes since H&P (within 30 days).         Ryan Morales MD

## 2022-07-22 ENCOUNTER — TELEPHONE (OUTPATIENT)
Dept: SURGERY | Facility: CLINIC | Age: 72
End: 2022-07-22

## 2022-07-22 NOTE — TELEPHONE ENCOUNTER
Reason for call:  Patient reporting a symptom    Symptom or request: Right swollen testicle and theres seems to be a lump above it as well    Duration (how long have symptoms been present): had surgery 11 days ago    Have you been treated for this before? Yes    Additional comments: Patient called wanting to let  know of the swelling of his right testicle and the lump right above it. Patient states theres no pain, but not sure if this is normal    Phone Number patient can be reached at:  Home number on file [phone] (home)    Best Time:  anytime    Can we leave a detailed message on this number:  YES    Call taken on 7/22/2022 at 1:36 PM by Viola Keene

## 2022-07-22 NOTE — TELEPHONE ENCOUNTER
Returned call to pt who has c/o swelling and lump. started immediatly post op. Was using Ice post op. Right testicle swollen, pt states there is a lump above the R testicle that was not there before. The incision is a couple inches away from this lump which is on the ventral (side closer to the penis) side of the testicle, lump feels a little harder than the testicle. Lump is hard. Pt feels that it is in the scrotum. He denies any popping sensation, no fever, no chills, generally feeling well. Advised pt of swelling post op in the testicles is to be expected. We advise snug fitting undergarments and elevating the scrotum with a rolled hand towel. advised it may be a hematoma as it is hard, but writer will send message to MD to advisee. Pt has post op next Friday. States it is uncomfortable.    Tia PHIPPS RN Specialty Triage 7/22/2022 2:54 PM

## 2022-07-22 NOTE — TELEPHONE ENCOUNTER
Spoke with MD, agreed may be hematoma. Okay to wait unless anything changes then to ED. Called pt and let him know. He will hot pack lump, which should help if it is a hematoma.    Tia PHIPPS RN Specialty Triage 7/22/2022 3:24 PM

## 2022-07-29 ENCOUNTER — OFFICE VISIT (OUTPATIENT)
Dept: SURGERY | Facility: CLINIC | Age: 72
End: 2022-07-29

## 2022-07-29 VITALS
HEART RATE: 66 BPM | DIASTOLIC BLOOD PRESSURE: 70 MMHG | BODY MASS INDEX: 30.79 KG/M2 | SYSTOLIC BLOOD PRESSURE: 142 MMHG | WEIGHT: 227 LBS

## 2022-07-29 DIAGNOSIS — Z09 S/P RIGHT INGUINAL HERNIA REPAIR, FOLLOW-UP EXAM: Primary | ICD-10-CM

## 2022-07-29 PROCEDURE — 99024 POSTOP FOLLOW-UP VISIT: CPT | Performed by: SURGERY

## 2022-07-29 NOTE — LETTER
7/29/2022         RE: Kvng Jones  [address] [place] [address]        Dear Colleague,    Thank you for referring your patient, Kvng Jones, to the [facility]. Please see a copy of my visit note below.    Kvng is a 72 year old male who is status post Right recurrent Inguinal hernia repair with right  ProGrip 12 by 8 cm and 4.3 cm ventrlaex placed in defect and straps sutured to the surrounding tissue to hold in place.  with no chills and nofever.      Patient's  Pain is  improving.  Appetite is  improving.    Wound(s)  Is/are   clean dry and intact with no evidence of infection.  Can see lateral incision has some reaction to the knot     Questions were answered and discussion of no lifting more than 20 pounds for  3 weeks after surgery.    removed a suture form a different surgery on his chest where a proline end of suture was sticking thru the skin.   Has swelling in the right testicle but it is less than a week ago.    Plan: follow up as needed.  Use antibiotic ointment on wound at night.     Time spent with the patient with greater that 50% of the time in discussion was 15 minutes.  Jarred Rdz MD        Author: Jarred Rdz MD Author Type: Physician Filed: 7/13/2022  2:46 PM   Note Status: Signed Cosign: Cosign Not Required Date of Service: 7/13/2022  1:09 PM   Creation Time: 7/13/2022  2:34 PM       : Jarred Rdz MD (Physician)                                OPERATIVE REPORT     July 13, 2022  Preoperative diagnosis: right  Inguinal hernia recurrent.   Postoperative diagnosis:  same with small defect deep to previous repair. Most likely where a suture tore thru   Procedure:  Right  Inguinal hernia repair with right  ProGrip 12 by 8 cm and 4.3 cm ventrlaex placed in defect and straps sutured to the surrounding tissue to hold in place.   Anesthesia:  General anesthesia with 0.25% marcaine placed thru out the procedure.   Blood  loss: 6 cc  Specimen: none sent  Surgeon : Jarred Rdz M.D.  Indications:  Kvng Jones is a 72 year old year old male with a recurrent right  inguinal hernia that is causing  discomfort.  It was felt that it should be repaired.  Risks and complications were explained to the patient including bleeding, risk of anenesthesia, infection, recurrance of hernia, damage to bowel, bladder and  vessels to the testicle.  That mesh is a better long term repair, but the down side is that if it gets infected will need to be removed.  Questions were answered and postoperative instructions were given to patient and any one with the patient.       Procedure:  The patient was brought to the OR, placed in supine position  after given  general anesthesia, the right  groin was prepped in sterile manner. After a time out, an Incision was made in the usual fashion with a knife and then blunt and sharp and cautery was used to dissect down to the external oblique.  The layers were fused together so it took some time to free the layers up.  The external oblique was opened in the direction of its fibers at the external canal with cautery and then the rest was mainly blunt dissection.  The spermatic cord was carefully freed of surrounding attachments and placed in a Penrose drain.  All attachments with the cremasteric muscles were taken down with cautery.  There was a hernia palpated just lateral and inferior to the spermatic cord.  After freeing up the area I was able to place a 4.3 cm ventralex in the space it was going to cover the defect well.  It was sutured at the straps with 3-0 vicryl and then surrounding tissue was closed around it. With the same suture.         it was an indirect hernia.  The space around the spermatic cord was also closed with still enough space to allow for swelling of the spermatic cord.         After that was done, I was able to place a finger between my closure and the spermatic cord without much  difficulty.  The  Right  12x8 cm ProGrip mesh was placed.  It was secured at the pubic tubercle with an 0-Vicryl suture at the marking on the mesh and then was laid on the floor, recreating the internal canal by bringing it around the spermatic cord.  It laid there very nicely.  I was able to place a finger between the spermatic cord and the mesh, but it looks like it is going to cover the area well.  After that was done and laid nicely, the area was irrigated with antibiotic irrigation with good clean return.  No evidence of any bleeding.  The spermatic cord was then placed on top of the mesh and the nerve which had been kept out of the way and seemed to be intact, was placed on  top of the mesh also.  Then, the external oblique fascia was closed with a continuous running 0 Vicryl suture, taking great care not to involve the nerve and closing over the spermatic cord.  The Ovidio's fascia was brought together with several interrupted 3-0 Vicryl sutures, and skin was closed with continuous running 4-0 Monocryl, covered with tincture of Benzoin, Steri-Strips and a Tegaderm, along with an abdominal binder.  The patient did receive antibiotics preoperatively.       Plan is to discharge him home today.  Lift no more than 20 pounds for 3 weeks.  I will see him back in approximately 2 weeks.           JARRED RDZ MD                         Again, thank you for allowing me to participate in the care of your patient.        Sincerely,        Jarred Rdz MD

## 2022-07-29 NOTE — PATIENT INSTRUCTIONS
Kvng is a 72 year old male who is status post Right recurrent Inguinal hernia repair with right  ProGrip 12 by 8 cm and 4.3 cm ventrlaex placed in defect and straps sutured to the surrounding tissue to hold in place.  with no chills and nofever.      Patient's  Pain is  improving.  Appetite is  improving.    Wound(s)  Is/are   clean dry and intact with no evidence of infection.  Can see lateral incision has some reaction to the knot     Questions were answered and discussion of no lifting more than 20 pounds for  3 weeks after surgery.    removed a suture form a different surgery on his chest where a proline end of suture was sticking thru the skin.   Has swelling in the right testicle but it is less than a week ago.    Plan: follow up as needed.  Use antibiotic ointment on wound at night.

## 2022-07-29 NOTE — PROGRESS NOTES
Kvng is a 72 year old male who is status post Right recurrent Inguinal hernia repair with right  ProGrip 12 by 8 cm and 4.3 cm ventrlaex placed in defect and straps sutured to the surrounding tissue to hold in place.  with no chills and nofever.      Patient's  Pain is  improving.  Appetite is  improving.    Wound(s)  Is/are   clean dry and intact with no evidence of infection.  Can see lateral incision has some reaction to the knot     Questions were answered and discussion of no lifting more than 20 pounds for  3 weeks after surgery.    removed a suture form a different surgery on his chest where a proline end of suture was sticking thru the skin.   Has swelling in the right testicle but it is less than a week ago.    Plan: follow up as needed.  Use antibiotic ointment on wound at night.     Time spent with the patient with greater that 50% of the time in discussion was 15 minutes.  Jarred Rdz MD        Author: Jarred Rdz MD Author Type: Physician Filed: 7/13/2022  2:46 PM   Note Status: Signed Cosign: Cosign Not Required Date of Service: 7/13/2022  1:09 PM   Creation Time: 7/13/2022  2:34 PM       : Jarred Rdz MD (Physician)                                OPERATIVE REPORT     July 13, 2022  Preoperative diagnosis: right  Inguinal hernia recurrent.   Postoperative diagnosis:  same with small defect deep to previous repair. Most likely where a suture tore thru   Procedure:  Right  Inguinal hernia repair with right  ProGrip 12 by 8 cm and 4.3 cm ventrlaex placed in defect and straps sutured to the surrounding tissue to hold in place.   Anesthesia:  General anesthesia with 0.25% marcaine placed thru out the procedure.   Blood loss: 6 cc  Specimen: none sent  Surgeon : Jarred Rdz M.D.  Indications:  Kvng Jones is a 72 year old year old male with a recurrent right  inguinal hernia that is causing  discomfort.  It was felt that it should be repaired.  Risks and  complications were explained to the patient including bleeding, risk of anenesthesia, infection, recurrance of hernia, damage to bowel, bladder and  vessels to the testicle.  That mesh is a better long term repair, but the down side is that if it gets infected will need to be removed.  Questions were answered and postoperative instructions were given to patient and any one with the patient.       Procedure:  The patient was brought to the OR, placed in supine position  after given  general anesthesia, the right  groin was prepped in sterile manner. After a time out, an Incision was made in the usual fashion with a knife and then blunt and sharp and cautery was used to dissect down to the external oblique.  The layers were fused together so it took some time to free the layers up.  The external oblique was opened in the direction of its fibers at the external canal with cautery and then the rest was mainly blunt dissection.  The spermatic cord was carefully freed of surrounding attachments and placed in a Penrose drain.  All attachments with the cremasteric muscles were taken down with cautery.  There was a hernia palpated just lateral and inferior to the spermatic cord.  After freeing up the area I was able to place a 4.3 cm ventralex in the space it was going to cover the defect well.  It was sutured at the straps with 3-0 vicryl and then surrounding tissue was closed around it. With the same suture.         it was an indirect hernia.  The space around the spermatic cord was also closed with still enough space to allow for swelling of the spermatic cord.         After that was done, I was able to place a finger between my closure and the spermatic cord without much difficulty.  The  Right  12x8 cm ProGrip mesh was placed.  It was secured at the pubic tubercle with an 0-Vicryl suture at the marking on the mesh and then was laid on the floor, recreating the internal canal by bringing it around the spermatic cord.  It  laid there very nicely.  I was able to place a finger between the spermatic cord and the mesh, but it looks like it is going to cover the area well.  After that was done and laid nicely, the area was irrigated with antibiotic irrigation with good clean return.  No evidence of any bleeding.  The spermatic cord was then placed on top of the mesh and the nerve which had been kept out of the way and seemed to be intact, was placed on  top of the mesh also.  Then, the external oblique fascia was closed with a continuous running 0 Vicryl suture, taking great care not to involve the nerve and closing over the spermatic cord.  The Ovidio's fascia was brought together with several interrupted 3-0 Vicryl sutures, and skin was closed with continuous running 4-0 Monocryl, covered with tincture of Benzoin, Steri-Strips and a Tegaderm, along with an abdominal binder.  The patient did receive antibiotics preoperatively.       Plan is to discharge him home today.  Lift no more than 20 pounds for 3 weeks.  I will see him back in approximately 2 weeks.           JESSY PEREZ MD

## 2022-08-11 ENCOUNTER — OFFICE VISIT (OUTPATIENT)
Dept: PEDIATRICS | Facility: CLINIC | Age: 72
End: 2022-08-11
Payer: COMMERCIAL

## 2022-08-11 ENCOUNTER — OFFICE VISIT (OUTPATIENT)
Dept: OPTOMETRY | Facility: CLINIC | Age: 72
End: 2022-08-11

## 2022-08-11 ENCOUNTER — OFFICE VISIT (OUTPATIENT)
Dept: URGENT CARE | Facility: URGENT CARE | Age: 72
End: 2022-08-11
Payer: COMMERCIAL

## 2022-08-11 ENCOUNTER — ANCILLARY PROCEDURE (OUTPATIENT)
Dept: CT IMAGING | Facility: CLINIC | Age: 72
End: 2022-08-11
Attending: EMERGENCY MEDICINE
Payer: COMMERCIAL

## 2022-08-11 VITALS
OXYGEN SATURATION: 98 % | HEART RATE: 65 BPM | DIASTOLIC BLOOD PRESSURE: 62 MMHG | TEMPERATURE: 97.5 F | BODY MASS INDEX: 30.92 KG/M2 | SYSTOLIC BLOOD PRESSURE: 117 MMHG | WEIGHT: 228 LBS

## 2022-08-11 VITALS
RESPIRATION RATE: 18 BRPM | HEART RATE: 68 BPM | DIASTOLIC BLOOD PRESSURE: 56 MMHG | TEMPERATURE: 99.1 F | SYSTOLIC BLOOD PRESSURE: 107 MMHG | OXYGEN SATURATION: 97 %

## 2022-08-11 DIAGNOSIS — H25.13 AGE-RELATED NUCLEAR CATARACT OF BOTH EYES: ICD-10-CM

## 2022-08-11 DIAGNOSIS — H53.8 BLURRED VISION: ICD-10-CM

## 2022-08-11 DIAGNOSIS — G45.9 TIA (TRANSIENT ISCHEMIC ATTACK): Primary | ICD-10-CM

## 2022-08-11 DIAGNOSIS — H53.9 VISUAL DISTURBANCE: ICD-10-CM

## 2022-08-11 DIAGNOSIS — H53.8 BLURRED VISION: Primary | ICD-10-CM

## 2022-08-11 DIAGNOSIS — R07.9 CHEST PAIN, UNSPECIFIED TYPE: ICD-10-CM

## 2022-08-11 LAB
ANION GAP SERPL CALCULATED.3IONS-SCNC: 8 MMOL/L (ref 3–14)
BUN SERPL-MCNC: 16 MG/DL (ref 7–30)
CALCIUM SERPL-MCNC: 8.9 MG/DL (ref 8.5–10.1)
CHLORIDE BLD-SCNC: 103 MMOL/L (ref 94–109)
CO2 SERPL-SCNC: 25 MMOL/L (ref 20–32)
CREAT SERPL-MCNC: 1.12 MG/DL (ref 0.66–1.25)
GFR SERPL CREATININE-BSD FRML MDRD: 69 ML/MIN/1.73M2
GLUCOSE BLD-MCNC: 156 MG/DL (ref 70–99)
POTASSIUM BLD-SCNC: 4 MMOL/L (ref 3.4–5.3)
SODIUM SERPL-SCNC: 136 MMOL/L (ref 133–144)

## 2022-08-11 PROCEDURE — 70496 CT ANGIOGRAPHY HEAD: CPT | Mod: GC | Performed by: RADIOLOGY

## 2022-08-11 PROCEDURE — 92002 INTRM OPH EXAM NEW PATIENT: CPT | Performed by: OPTOMETRIST

## 2022-08-11 PROCEDURE — 36415 COLL VENOUS BLD VENIPUNCTURE: CPT

## 2022-08-11 PROCEDURE — 93000 ELECTROCARDIOGRAM COMPLETE: CPT | Performed by: PHYSICIAN ASSISTANT

## 2022-08-11 PROCEDURE — 99207 REFERRAL TO ACUTE AND DIAGNOSTIC SERVICES: CPT | Performed by: PHYSICIAN ASSISTANT

## 2022-08-11 PROCEDURE — 99215 OFFICE O/P EST HI 40 MIN: CPT

## 2022-08-11 PROCEDURE — 70498 CT ANGIOGRAPHY NECK: CPT | Mod: GC | Performed by: RADIOLOGY

## 2022-08-11 PROCEDURE — 80048 BASIC METABOLIC PNL TOTAL CA: CPT

## 2022-08-11 PROCEDURE — 82565 ASSAY OF CREATININE: CPT

## 2022-08-11 RX ORDER — IOPAMIDOL 755 MG/ML
75 INJECTION, SOLUTION INTRAVASCULAR ONCE
Status: COMPLETED | OUTPATIENT
Start: 2022-08-11 | End: 2022-08-11

## 2022-08-11 RX ADMIN — IOPAMIDOL 75 ML: 755 INJECTION, SOLUTION INTRAVASCULAR at 15:14

## 2022-08-11 ASSESSMENT — VISUAL ACUITY
OD_CC: 20/20
OS_CC: 20/25
OD_CC+: -2
METHOD: SNELLEN - LINEAR
CORRECTION_TYPE: GLASSES

## 2022-08-11 ASSESSMENT — ENCOUNTER SYMPTOMS
CONSTITUTIONAL NEGATIVE: 1
NAUSEA: 0
MUSCULOSKELETAL NEGATIVE: 1
ABDOMINAL PAIN: 0
WHEEZING: 0
RESPIRATORY NEGATIVE: 1
GASTROINTESTINAL NEGATIVE: 1
DYSURIA: 0
CARDIOVASCULAR NEGATIVE: 1
FREQUENCY: 0
ALLERGIC/IMMUNOLOGIC NEGATIVE: 1
VOMITING: 0
DIARRHEA: 0
EYE ITCHING: 0
CHEST TIGHTNESS: 0
EYE DISCHARGE: 0
EYE PAIN: 0
PALPITATIONS: 0
MYALGIAS: 0
SORE THROAT: 0
SHORTNESS OF BREATH: 0
FEVER: 0
HEMATURIA: 0
PHOTOPHOBIA: 0
HEADACHES: 0
NEUROLOGICAL NEGATIVE: 1
COUGH: 0
CHILLS: 0

## 2022-08-11 ASSESSMENT — CUP TO DISC RATIO
OD_RATIO: 0.2
OS_RATIO: 0.2

## 2022-08-11 ASSESSMENT — SLIT LAMP EXAM - LIDS
COMMENTS: DERMATOCHALASIS
COMMENTS: DERMATOCHALASIS

## 2022-08-11 ASSESSMENT — TONOMETRY
OS_IOP_MMHG: 18
IOP_METHOD: TONOPEN
OD_IOP_MMHG: 14

## 2022-08-11 ASSESSMENT — EXTERNAL EXAM - RIGHT EYE: OD_EXAM: NORMAL

## 2022-08-11 ASSESSMENT — EXTERNAL EXAM - LEFT EYE: OS_EXAM: NORMAL

## 2022-08-11 ASSESSMENT — PAIN SCALES - GENERAL: PAINLEVEL: NO PAIN (0)

## 2022-08-11 NOTE — PATIENT INSTRUCTIONS
Take 162mg Aspirin (2 baby Aspirins) daily until you follow up with the neurologist per the referral. Follow up with your Cardiologist for an echocardiogram as we discussed.

## 2022-08-11 NOTE — PROGRESS NOTES
Chief Complaint   Patient presents with     Blurred Vision Evaluation     Patient woke up yesterday with vision blurred. Top half of vision was like a grid over vision. Blurry in both eyes. Patient took low dose aspirin and went back to bed. When woke up was worse in od eye then eventually went away. Today vision is back to normal with floaters.     Laterality: both eyes   Onset: sudden   Onset: 1 day ago   Location: central vision and peripheral vision   Quality: blurred vision, flashing lights, and shade in vision   Severity: severe   Comments: Patient woke up yesterday with vision blurred. Top half of vision was like a grid over vision. Blurry in both eyes. Patient took low dose aspirin and went back to bed. When woke up was worse in od eye then eventually went away. Today vision is back to normal with floaters.   Referred from Urgent Care- will be referring to the [facility] at [place] for MRI and MRA after eyes are evaluated to rule out stroke.    Medical, surgical and family histories reviewed and updated 8/11/2022.       OBJECTIVE: See Ophthalmology exam    ASSESSMENT:    ICD-10-CM    1. TIA (transient ischemic attack)  G45.9    2. Age-related nuclear cataract of both eyes  H25.13       PLAN:     Patient Instructions   Vision change is not due to eye problems/disease.  Follow up as recommended by Urgent Care provider to rule out stroke.  Patient is going to [facility] clinic immediately after leaving the clinic.    You have the start of mild cataracts.  You may notice some blurred vision or glare with night driving.  It is important that you wear good sunglasses to protect your eyes from the ultraviolet light from the sun.  I recommend that you return in 1 year for an eye exam unless there are any sudden changes in your vision.       Recommend annual eye exams.    Telly Joseph, OD

## 2022-08-11 NOTE — LETTER
8/11/2022         RE: Kvng Jones  [address] [facility] [place] [address]        Dear Colleague,    Thank you for referring your patient, Kvng Jones, to the [facility]. Please see a copy of my visit note below.    Chief Complaint   Patient presents with     Blurred Vision Evaluation     Patient woke up yesterday with vision blurred. Top half of vision was like a grid over vision. Blurry in both eyes. Patient took low dose aspirin and went back to bed. When woke up was worse in od eye then eventually went away. Today vision is back to normal with floaters.     Laterality: both eyes   Onset: sudden   Onset: 1 day ago   Location: central vision and peripheral vision   Quality: blurred vision, flashing lights, and shade in vision   Severity: severe   Comments: Patient woke up yesterday with vision blurred. Top half of vision was like a grid over vision. Blurry in both eyes. Patient took low dose aspirin and went back to bed. When woke up was worse in od eye then eventually went away. Today vision is back to normal with floaters.   Referred from Urgent Care- will be referring to the [facility] at [place] for MRI and MRA after eyes are evaluated to rule out stroke.    Medical, surgical and family histories reviewed and updated 8/11/2022.       OBJECTIVE: See Ophthalmology exam    ASSESSMENT:    ICD-10-CM    1. TIA (transient ischemic attack)  G45.9    2. Age-related nuclear cataract of both eyes  H25.13       PLAN:     Patient Instructions   Vision change is not due to eye problems/disease.  Follow up as recommended by Urgent Care provider to rule out stroke.  Patient is going to [facility] [place] immediately after leaving the clinic.    You have the start of mild cataracts.  You may notice some blurred vision or glare with night driving.  It is important that you wear good sunglasses to protect your eyes from the ultraviolet light from the sun.  I  recommend that you return in 1 year for an eye exam unless there are any sudden changes in your vision.       Recommend annual eye exams.    Telly Joseph, OD           Again, thank you for allowing me to participate in the care of your patient.        Sincerely,        Telly Joseph, OD

## 2022-08-11 NOTE — PROGRESS NOTES
Sheela Milner is a 72 year old, presenting for the following health issues:  Tia (Transient Ischemic Attack)      HPI Patient was sent to the [facility] for R/O TIA due to vision changes on 8/10/22. Patient reports blurred vision that has resolved 8/11/22. Patient will have an MRI and CT to R/O TIA    Pain History:  When did you first notice your pain? - Acute Pain   Have you seen anyone else for your pain? No  Where in your body do you have pain? Rule out TIA \ Blurred vision yesterday morning

## 2022-08-11 NOTE — PROGRESS NOTES
Assessment & Plan     Diagnosis:    (H53.8) Blurred vision  (primary encounter diagnosis)    (G45.9) TIA (transient ischemic attack)      Medical Decision Making:  Kvng Jones is a 72 year old male who presents for evaluation of blurred from yesterday morning around 7AM in both eyes (right worse than the left -- upper visual fields.) He notes taking 81mg ASA and going back to bed; woke up with worse vision in the R eye. All blurred vision resolved around noon yesterday; notes some floaters at this time. Differential considered for symptoms included CVA, TIA, dissection, cerebral tumor, migraine, infection, metabolic derangement, demyelination, etc.    Patient was sent here to the [place] [facility] from  and optometry for further evaluation. He had a normal EKG without evidence of atrial fibrillation or other arrhythmia and had evidence of mild cataracts, but nothing to suggest a cause of his acute transient visual disturbance yesterday. He has been taking 81mg ASA since yesterday.    Detailed neurologic exam and history here in the ADS shows complete resolution of symptoms. MRI brain and CTA head/neck as noted below shows no acute infarct, occlusion or significant stenosis.  Based on symptom resolution, reassuring imaging and NIH stroke scale, they are not a candidate for TPA. Did discuss this with the patient and they express understanding. Given reassuring work up and ABCD^2 score of 3 (age and duration of symptoms); patient was started on 162mg ASA and will see neurology ASAP (referral placed). Also discussed close follow up with cardiology for repeat echocardiogram.     Patient voices understanding and agreement with the plan including reasons to go to the ER immediately for any recurrent symptoms, numbness, weakness, vision change facial droop etc, as well as to be seen by a more consistent care-giver, such as their PCP. All questions answered.    67 minutes spent on the date of the encounter doing chart  "review, review of outside records, review of test results, interpretation of tests, patient visit, documentation, discussion with other provider(s) and discussion with family       Bart Breaux PA-C  [facility]    Subjective     Kvng Jones is a 72 year old male who presents to clinic today for the following health issues:  Chief Complaint   Patient presents with     Blurred Vision       HPI  Patient states that yesterday morning he woke up around 7 AM with blurry visino in both eyes; took a baby aspirin and went back to bed. He notes waking up around 10 AM with worsening blurry vision in the right eye which resolved after about noon. He notes no symptoms now but was concerned for a stroke. He went to urgent care and had a normal EKG\" and \"normal eye exam\" and came here for further evaluation and TIA/CVA work up.     He denies any symptoms at this time including vision changes, numbness, weakness, slurred speech, headache, neck pain, chest pain, shortness of breath, fevers, dizziness, lightheadedness. He notes no recent head or neck injury or other concerns. He notes no hx of stroke, heart disease or diabetes.       Review of Systems    See HPI    Objective      Vitals: /56 (BP Location: Right arm, Patient Position: Sitting, Cuff Size: Adult Regular)   Pulse 68   Temp 99.1  F (37.3  C) (Oral)   Resp 18   SpO2 97%       Patient Vitals for the past 24 hrs:   BP Temp Temp src Pulse Resp SpO2   08/11/22 1554 107/56 -- -- 68 -- 97 %   08/11/22 1239 126/65 99.1  F (37.3  C) Oral 64 18 99 %       Vital signs reviewed by: Bart Breaux PA-C    Physical Exam   Constitutional: Patient is alert and cooperative. No acute distress.  Head: Atraumatic. No raccoon eyes or duvall signs.  Neck: Normal ROM. No carotid bruits.  Mouth: Mucous membranes are moist. Normal tongue and tonsil. Posterior oropharynx is clear.  Eyes: Conjunctivae, EOMI and lids are normal. PERRL.  Cardiovascular: Regular " rate and rhythm.  Pulmonary/Chest: Lungs are clear to auscultation throughout. Effort normal. No respiratory distress. No wheezes, rales or rhonchi.  GI: Abdomen is soft and non-tender throughout. No CVA tenderness bilaterally.  Neurological:     National Institutes of Health Stroke Scale: Performed at 1:15 PM 8/11/2022  Exam Interval: Baseline   Score    Level of consciousness: (0)   Alert, keenly responsive    LOC questions: (0)   Answers both questions correctly    LOC commands: (0)   Performs both tasks correctly    Best gaze: (0)   Normal    Visual: (0)   No visual loss    Facial palsy: (0)   Normal symmetrical movements    Motor arm (left): (0)   No drift    Motor arm (right): (0)   No drift    Motor leg (left): (0)   No drift    Motor leg (right): (0)   No drift    Limb ataxia: (0)   Absent    Sensory: (0)   Normal- no sensory loss    Best language: (0)   Normal- no aphasia    Dysarthria: (0)   Normal    Extinction and inattention: (0)   No abnormality        Total Score:  0   GCS: 15. Alert and oriented x3. CN 2-12 intact. Strength and sensation are intact and symmetric in the bilateral upper and lower extremities. Normal FNF and heel-santillan tests. Romberg normal. Gait is stable. Speech is fluent and face is symmetric.  Skin: No rash noted on visualized skin.  Psychiatric:The patient has a normal mood and affect.       Labs/Imaging:  Results for orders placed or performed in visit on 08/11/22   MR Brain w/o Contrast     Status: None    Narrative    MR BRAIN W/O CONTRAST 8/11/2022 1:19 PM    Provided History: TIA (transient ischemic attack)  ICD-10: TIA (transient ischemic attack)    Comparison:  No prior similar studies     Technique: Sagittal T1-weighted, coronal T2-weighted, axial T2 FLAIR,  axial susceptibility weighted, and axial diffusion-weighted with ADC  map images of the brain were obtained without intravenous contrast.    Findings:   No intracranial mass lesion, mass effect, midline shift, or  abnormal  fluid collection. The ventricles and sulci are normal for age. No  abnormality of reduced diffusion.  Normal intravascular flow voids.  Mild leukoaraiosis.    Left maxillary sinus mucous retention cyst. The remaining paranasal  sinuses are clear. Mastoid air cells are also clear.      Impression    Impression: No acute intracranial pathology. Mild leukoaraiosis.         VIVIANA AMATO MD         SYSTEM ID:  [id number]   Results for orders placed or performed in visit on 08/11/22   CTA Head Neck with Contrast     Status: None    Narrative    CTA HEAD NECK W CONTRAST 8/11/2022 3:16 PM    CT angiogram of the neck   CT angiogram of the base of the brain with contrast  Reconstruction by the Radiologist on the 3D workstation    Provided History:  TIA (transient ischemic attack); Blurred vision    Comparison: Brain MRI 8/11/2022.    Technique:    CT HEAD:Using multidetector thin collimation helical acquisition  technique, axial, coronal and sagittal CT images from the skull base  to the vertex were obtained without intravenous contrast.   (topogram) image(s) also obtained and reviewed.    HEAD and NECK CTA: During rapid bolus intravenous injection of  nonionic contrast material, axial images were obtained using thin  collimation multidetector helical technique from the base of the upper  aortic arch through the [place]. This CT angiogram data was  reconstructed at thin intervals with mild overlap. Images were sent to  the [employer] workstation, and 3D reconstructions were obtained. The  axial source images, multiplanar reformations, 3D reconstructions in  both maximum intensity projection display and volume rendered models  were reviewed, with reconstructions performed by the technologist and  the radiologist.    Findings:    CT head:     No acute intracranial hemorrhage. The gray-white matter  differentiation is preserved. No sulcal effacement or mass effect. No  midline shift. Basal cisterns are  patent. Ventricles are proportional  to the cerebral sulci. Mild periventricular hypoattenuation,  nonspecific but favored to represent chronic small vessel ischemic  disease given the patient's age.    The bony calvarium bones of the skull base are within normal limits.  Polypoid mucosal thickening of the left maxillary sinus. Mastoid air  cells are clear. Orbits are unremarkable. Soft tissues are  unremarkable.    Head CTA demonstrates no aneurysm or stenosis of the major  intracranial arteries. The anterior communicating artery is patent.  Posterior communicating arteries are patent. Atherosclerotic  calcifications of the carotid siphons.    Neck CTA: Hypoplastic right vertebral artery with very thin  opacification of the V4 segment at the level of the PICA that appears  patent to the basilar artery. The origins of the great vessels from  the aortic arch are patent. The normal distal right internal carotid  artery measures 5 mm. The normal distal left internal carotid artery  measures 5 mm. Atherosclerotic disease at bilateral carotid  bifurcations with approximately 30-35 % stenosis (the artery measures  3.9 mm, while normal distal internal carotid artery measuring 6.4 mm).    No mass or cervical adenopathy within the visualized portions of the  cervical soft tissues or lung apices.       Impression    Impression:  1. CT head:   -No acute intracranial pathology.   -Mild leukoaraiosis.  2. Head CTA demonstrates no aneurysm or stenosis of the major  intracranial arteries.   3. Neck CTA demonstrates hypoplastic right vertebral artery with very  thin opacification of the V4 segment at the level of the PICA that  appears patent to the basilar artery.    I have personally reviewed the examination and initial interpretation  and I agree with the findings.    VIVIANA AMATO MD         SYSTEM ID:  [id number]   Basic metabolic panel     Status: Abnormal   Result Value Ref Range    Sodium 136 133 - 144 mmol/L    Potassium  4.0 3.4 - 5.3 mmol/L    Chloride 103 94 - 109 mmol/L    Carbon Dioxide (CO2) 25 20 - 32 mmol/L    Anion Gap 8 3 - 14 mmol/L    Urea Nitrogen 16 7 - 30 mg/dL    Creatinine 1.12 0.66 - 1.25 mg/dL    Calcium 8.9 8.5 - 10.1 mg/dL    Glucose 156 (H) 70 - 99 mg/dL    GFR Estimate 69 >60 mL/min/1.73m2       Reading per radiology      EKG (8/11/22 - [facility]):  EKG - Reviewed and interpreted by: Bart Beraux PA-C  Rate: 64 bpm   WA: 176 ms  QTc: 395 ms  Normal sinus rhythm. normal intervals, no acute ST/T changes c/w ischemia, no LVH by voltage criteria.        Bart Breaux PA-C, August 11, 2022

## 2022-08-11 NOTE — PROGRESS NOTES
Chief Complaint:    Chief Complaint   Patient presents with     Eye Problem     Blurred vision (left eye) when pt woke up. Pt took low dose aspirin. Onset- Wednesday      Medical Decision Making:    Vital signs reviewed by Antione Bryant PA-C  /62 (BP Location: Left arm, Patient Position: Sitting, Cuff Size: Adult Regular)   Pulse 65   Temp 97.5  F (36.4  C) (Tympanic)   Wt 103.4 kg (228 lb)   SpO2 98%   BMI 30.92 kg/m      Differential Diagnosis:  TIA, Stroke     ASSESSMENT:     1. Blurred vision    2. Visual disturbance    3. Chest pain, unspecified type         PLAN:     Patient is in no acute distress.  He is afebrile with stable vital signs.  EKG was negative for any acute ST or ischemic changes per my read.   Neuro exam was benign.  He is currently asymptomatic.   Patient brought to eye clinic for evaluation of his eyes.      Patient will be sent to the ADS for further evaluation and possible imaging.  ADS staff notified and handoff completed with ADS provider.    Patient instructed to follow up with PCP in the next week.  Patient verbalized understanding and agreed with this plan.    Labs:     No results found for any visits on 08/11/22.    Current Meds:    Current Outpatient Medications:      esomeprazole (NEXIUM) 20 MG DR capsule, Take 20 mg by mouth, Disp: , Rfl:      melatonin 5 MG CAPS, Take 1 tablet by mouth, Disp: , Rfl:      polyethylene glycol (MIRALAX) 17 GM/Dose powder, Take 17 g by mouth, Disp: , Rfl:      rosuvastatin (CRESTOR) 10 MG tablet, Take 10 mg by mouth daily, Disp: , Rfl:      tamsulosin (FLOMAX) 0.4 MG capsule, Take 1 capsule (0.4 mg) by mouth daily, Disp: 90 capsule, Rfl: 1     triamcinolone (KENALOG) 0.1 % external ointment, , Disp: , Rfl:     Allergies:  No Known Allergies    SUBJECTIVE    HPI: Kvng Jones is an 72 year old male who presents for evaluation and treatment of blurred vision in the L and R eye. Symptoms started yesterday morning with some flashing of  lights in the L eye.  This started to resolve when he experienced blurred vision in the R eye.  Patient took a nap and the symptoms have resolved.  He does not have any visual disturbances at this time.  He denies any headache, dizziness, unilateral weakness, nausea, vomiting, or slurring of words.      ROS:      Review of Systems   Constitutional: Negative.  Negative for chills and fever.   HENT: Negative.  Negative for sore throat.    Eyes: Positive for visual disturbance. Negative for photophobia, pain, discharge and itching.   Respiratory: Negative.  Negative for cough, chest tightness, shortness of breath and wheezing.    Cardiovascular: Negative.  Negative for chest pain and palpitations.   Gastrointestinal: Negative.  Negative for abdominal pain, diarrhea, nausea and vomiting.   Genitourinary: Negative for dysuria, frequency, hematuria and urgency.   Musculoskeletal: Negative.  Negative for myalgias.   Skin: Negative for rash.   Allergic/Immunologic: Negative.  Negative for immunocompromised state.   Neurological: Negative.  Negative for headaches.        Family History   No family history on file.    Social History  Social History     Socioeconomic History     Marital status:      Spouse name: Not on file     Number of children: Not on file     Years of education: Not on file     Highest education level: Not on file   Occupational History     Not on file   Tobacco Use     Smoking status: Never Smoker     Smokeless tobacco: Never Used   Vaping Use     Vaping Use: Never used   Substance and Sexual Activity     Alcohol use: Yes     Comment: occasional     Drug use: Never     Sexual activity: Not on file   Other Topics Concern     Parent/sibling w/ CABG, MI or angioplasty before 65F 55M? Not Asked   Social History Narrative     Not on file     Social Determinants of Health     Financial Resource Strain: Not on file   Food Insecurity: Not on file   Transportation Needs: Not on file   Physical Activity: Not  on file   Stress: Not on file   Social Connections: Not on file   Intimate Partner Violence: Not on file   Housing Stability: Not on file        Surgical History:  Past Surgical History:   Procedure Laterality Date     ABDOMEN SURGERY  1982    rectal fistula     APPENDECTOMY  1972     COLONOSCOPY N/A 09/01/2021    Procedure: COLONOSCOPY, WITH POLYPECTOMY AND BIOPSY;  Surgeon: Evangelina Aldana MD;  Location:  GI     HERNIORRHAPHY INGUINAL Right 7/13/2022    Procedure: open right inguinal hernia with mesh;  Surgeon: Jarred Rdz MD;  Location: [facility] OR     INGUINAL HERNIA REPAIR Right 1997        Problem List:  Patient Active Problem List   Diagnosis     Erectile dysfunction of nonorganic origin     No blood products     Psoriasis     Recurrent right inguinal hernia     Benign prostatic hyperplasia with weak urinary stream           OBJECTIVE:     Vital signs noted and reviewed by Antione Bryant PA-C  /62 (BP Location: Left arm, Patient Position: Sitting, Cuff Size: Adult Regular)   Pulse 65   Temp 97.5  F (36.4  C) (Tympanic)   Wt 103.4 kg (228 lb)   SpO2 98%   BMI 30.92 kg/m       PEFR:    Physical Exam  Vitals and nursing note reviewed.   Constitutional:       General: He is not in acute distress.     Appearance: He is well-developed. He is not ill-appearing, toxic-appearing or diaphoretic.   HENT:      Head: Normocephalic and atraumatic.      Right Ear: Hearing, tympanic membrane, ear canal and external ear normal. Tympanic membrane is not perforated, erythematous, retracted or bulging.      Left Ear: Hearing, tympanic membrane, ear canal and external ear normal. Tympanic membrane is not perforated, erythematous, retracted or bulging.      Nose: Nose normal. No mucosal edema, congestion or rhinorrhea.      Mouth/Throat:      Pharynx: No oropharyngeal exudate or posterior oropharyngeal erythema.      Tonsils: No tonsillar exudate or tonsillar abscesses. 0 on the right. 0 on the left.    Eyes:      General: Lids are normal.         Right eye: No foreign body, discharge or hordeolum.         Left eye: No foreign body, discharge or hordeolum.      Conjunctiva/sclera:      Right eye: Right conjunctiva is not injected. No chemosis, exudate or hemorrhage.     Left eye: Left conjunctiva is not injected. No chemosis, exudate or hemorrhage.     Pupils: Pupils are equal, round, and reactive to light.   Cardiovascular:      Rate and Rhythm: Normal rate and regular rhythm.      Heart sounds: Normal heart sounds, S1 normal and S2 normal. Heart sounds not distant. No murmur heard.    No friction rub. No gallop.   Pulmonary:      Effort: Pulmonary effort is normal. No respiratory distress.      Breath sounds: Normal breath sounds. No decreased breath sounds, wheezing, rhonchi or rales.   Abdominal:      General: Bowel sounds are normal. There is no distension.      Palpations: Abdomen is soft.      Tenderness: There is no abdominal tenderness.   Musculoskeletal:      Cervical back: Normal range of motion and neck supple.   Lymphadenopathy:      Cervical: No cervical adenopathy.   Skin:     General: Skin is warm and dry.      Findings: No rash.   Neurological:      Mental Status: He is alert.      GCS: GCS eye subscore is 4. GCS verbal subscore is 5. GCS motor subscore is 6.      Cranial Nerves: Cranial nerves 2-12 are intact. No cranial nerve deficit or facial asymmetry.      Sensory: Sensation is intact. No sensory deficit.      Motor: Motor function is intact. No weakness, atrophy or abnormal muscle tone.      Coordination: Coordination is intact. Romberg sign negative. Coordination normal. Finger-Nose-Finger Test normal. Rapid alternating movements normal.      Gait: Gait is intact. Gait normal.   Psychiatric:         Attention and Perception: He is attentive.         Speech: Speech normal.         Behavior: Behavior normal. Behavior is cooperative.         Thought Content: Thought content normal.          Judgment: Judgment normal.             Antione Bryant PA-C  8/11/2022, 11:01 AM

## 2022-08-11 NOTE — PATIENT INSTRUCTIONS
Vision change is not due to eye problems/disease.  Follow up as recommended by Urgent Care provider to rule out stroke.  Patient is going to [facility] immediately after leaving the clinic.    You have the start of mild cataracts.  You may notice some blurred vision or glare with night driving.  It is important that you wear good sunglasses to protect your eyes from the ultraviolet light from the sun.  I recommend that you return in 1 year for an eye exam unless there are any sudden changes in your vision.       Recommend annual eye exams.    Telly Joseph, OD      The affects of the dilating drops last for 4- 6 hours.  You will be more sensitive to light and vision will be blurry up close.  Do not drive if you do not feel comfortable.  Mydriatic sunglasses were given if needed.      Optometry Providers       Clinic Locations                                 Telephone Number   Dr. Rhianna Lezama   [place]  [place] [phone]     [facility] [facility] Hours:                [facility] Hours:       [facility] Hours:   [address]   [address] [address]      [address] [place] [address]   [place] [address]    [place] [address]  Phone: [phone]                    Phone: [phone]     Phone: [phone]                      Monday 8:00-6:00                          Monday 8:00-6:00                          Monday 8:00-6:00              Tuesday 8:00-6:00                          Tuesday 8:00-6:00                          Tuesday 8:00-6:00              Wednesday 8:00-6:00                  Wednesday 8:00-6:00                   Wednesday 8:00-6:00      Thursday 8:00-6:00                        Thursday 8:00-6:00                         Thursday 8:00-6:00            Friday 8:00-5:00                              Friday 8:00-5:00                               Friday 8:00-5:00    [facility] Hours:   [address] [place] [address]  [phone]    Monday 9:00-6:00  Tuesday 9:00-6:00  Wednesday 9:00-6:00  Thursday 9:00-6:00  Friday 9:00-5:00  Please log on to [email or web address] to order your contact lenses.  The link is found on the [facility] Care [facility] page.  As always, Thank you for trusting us with your health care needs!     no... no...

## 2022-08-12 LAB
CREAT BLD-MCNC: 1.3 MG/DL (ref 0.7–1.3)
GFR SERPL CREATININE-BSD FRML MDRD: 58 ML/MIN/1.73M2

## 2022-08-30 ENCOUNTER — NURSE TRIAGE (OUTPATIENT)
Dept: NURSING | Facility: CLINIC | Age: 72
End: 2022-08-30

## 2022-08-30 NOTE — TELEPHONE ENCOUNTER
Nurse Triage SBAR    Is this a 2nd Level Triage? YES, LICENSED PRACTITIONER REVIEW IS REQUIRED    Situation:   Hematoma on left arm    Background:   Bruising started on 8/26, Nurse took 6 vials of blood from it, not on blood thinners, denies history of bleeding disorders    Assessment:   not raised, some swelling. keeps bleeding bright red underneath skin, currently has ice pack on there    Protocol Recommended Disposition:   See HCP Within 4 Hours (Or PCP Triage)    Recommendation:   Go to  ?     Paged to provider On Call MD Elpidio Morales via answering service at 630pm, suggest go to .    Does the patient meet one of the following criteria for ADS visit consideration? 16+ years old, with an [facility] PCP     TIP  Providers, please consider if this condition is appropriate for management at one of our Acute and Diagnostic Services sites.     If patient is a good candidate, please use dotphrase <dot>triageresponse and select Refer to ADS to document.      Provider Recommendation Follow Up:   Reached patient/caregiver. Informed of provider's recommendations. Patient verbalized understanding and agrees with the plan. Will go to [facility].        Reason for Disposition    [1] Raised bruise AND [2] size > 2 inches (5 cm) AND [3] getting bigger    Additional Information    Negative: Shock suspected (e.g., cold/pale/clammy skin, too weak to stand, low BP, rapid pulse)    Negative: Sounds like a life-threatening emergency to the triager    Negative: Bruises with fever    Negative: Tiny bruises (spots or dots) of unknown cause    Negative: Bruise(s) of forehead or head    Negative: Bruise(s) of face or jaw    Negative: Followed an injury, and triager doesn't know which injury guideline to use first    Negative: Post-operative bruising    Negative: Dizziness or lightheadedness    Negative: [1] Bruise on head/face, chest, or abdomen AND [2]  taking Coumadin (warfarin) or other strong blood thinner, or known bleeding  disorder (e.g., thrombocytopenia)    Negative: Unexplained bleeding from another site (e.g., gums, nose, urine) as well    Negative: Patient sounds very sick or weak to the triager    Negative: [1] Not caused by an injury AND [2] 5 or more bruises now    Protocols used: BRUISES-A-AH

## 2022-10-22 ENCOUNTER — HEALTH MAINTENANCE LETTER (OUTPATIENT)
Age: 72
End: 2022-10-22

## 2023-11-05 ENCOUNTER — HEALTH MAINTENANCE LETTER (OUTPATIENT)
Age: 73
End: 2023-11-05

## 2024-08-05 ENCOUNTER — TELEPHONE (OUTPATIENT)
Dept: FAMILY MEDICINE | Facility: CLINIC | Age: 74
End: 2024-08-05
Payer: COMMERCIAL

## 2024-08-05 NOTE — TELEPHONE ENCOUNTER
Patient Quality Outreach    Patient is due for the following:   Physical Annual Wellness Visit    Next Steps:   Patient is a [facility] patient only tae Dr. Deal because he needed a Pre Op  and couldn't get in to his own on time.    Type of outreach:    See above    Next Steps:  Reach out within 90 days via  done .    Max number of attempts reached: Yes. Will try again in 90 days if patient still on fail list.    Questions for provider review:    None           Leighann Reis, [facility]  Chart routed to Closing encounter  .

## 2024-12-22 ENCOUNTER — HEALTH MAINTENANCE LETTER (OUTPATIENT)
Age: 74
End: 2024-12-22

## (undated) DEVICE — DRAIN PENROSE 0.25"X18" LATEX FREE GR201

## (undated) DEVICE — DRAPE IOBAN INCISE 23X17" 6650EZ

## (undated) DEVICE — DRAPE LAP W/ARMBOARD 29410

## (undated) DEVICE — PREP CHLORAPREP 26ML TINTED ORANGE  260815

## (undated) DEVICE — DRSG ABDOMINAL 07 1/2X8" 7197D

## (undated) DEVICE — PACK MINOR SBA15MIFSE

## (undated) DEVICE — SU MONOCRYL 4-0 PS-2 18" UND Y496G

## (undated) DEVICE — GLOVE PROTEXIS W/NEU-THERA 7.5  2D73TE75

## (undated) DEVICE — SYR 10ML LL W/O NDL

## (undated) DEVICE — DRSG STERI STRIP 1/2X4" R1547

## (undated) DEVICE — SUCTION TIP YANKAUER W/O VENT K86

## (undated) DEVICE — SU VICRYL 0 CT-2 27" UND J270H

## (undated) DEVICE — SOL WATER IRRIG 1000ML BOTTLE 07139-09

## (undated) DEVICE — BNDG ABDOMINAL BINDER 09X46-62"

## (undated) DEVICE — DRSG TEGADERM 4X4 3/4" 1626W

## (undated) DEVICE — SOL ADH LIQUID BENZOIN SWAB 0.6ML C1544

## (undated) DEVICE — SU VICRYL 3-0 SH 27" UND J416H

## (undated) DEVICE — DECANTER TRANSFER DEVICE 2008S

## (undated) RX ORDER — DEXAMETHASONE SODIUM PHOSPHATE 4 MG/ML
INJECTION, SOLUTION INTRA-ARTICULAR; INTRALESIONAL; INTRAMUSCULAR; INTRAVENOUS; SOFT TISSUE
Status: DISPENSED
Start: 2022-07-13

## (undated) RX ORDER — FENTANYL CITRATE-0.9 % NACL/PF 10 MCG/ML
PLASTIC BAG, INJECTION (ML) INTRAVENOUS
Status: DISPENSED
Start: 2022-07-13

## (undated) RX ORDER — CEFAZOLIN SODIUM 1 G/3ML
INJECTION, POWDER, FOR SOLUTION INTRAMUSCULAR; INTRAVENOUS
Status: DISPENSED
Start: 2022-07-13

## (undated) RX ORDER — ACETAMINOPHEN 325 MG/1
TABLET ORAL
Status: DISPENSED
Start: 2022-07-13

## (undated) RX ORDER — BUPIVACAINE HYDROCHLORIDE 2.5 MG/ML
INJECTION, SOLUTION INFILTRATION; PERINEURAL
Status: DISPENSED
Start: 2022-07-13

## (undated) RX ORDER — FENTANYL CITRATE 50 UG/ML
INJECTION, SOLUTION INTRAMUSCULAR; INTRAVENOUS
Status: DISPENSED
Start: 2022-07-13

## (undated) RX ORDER — FENTANYL CITRATE 50 UG/ML
INJECTION, SOLUTION INTRAMUSCULAR; INTRAVENOUS
Status: DISPENSED
Start: 2021-09-01

## (undated) RX ORDER — ONDANSETRON 2 MG/ML
INJECTION INTRAMUSCULAR; INTRAVENOUS
Status: DISPENSED
Start: 2022-07-13